# Patient Record
Sex: MALE | Race: WHITE | NOT HISPANIC OR LATINO | Employment: FULL TIME | URBAN - METROPOLITAN AREA
[De-identification: names, ages, dates, MRNs, and addresses within clinical notes are randomized per-mention and may not be internally consistent; named-entity substitution may affect disease eponyms.]

---

## 2017-02-06 ENCOUNTER — OFFICE VISIT (OUTPATIENT)
Dept: DERMATOLOGY | Facility: CLINIC | Age: 22
End: 2017-02-06

## 2017-02-06 VITALS — HEART RATE: 55 BPM | SYSTOLIC BLOOD PRESSURE: 115 MMHG | DIASTOLIC BLOOD PRESSURE: 68 MMHG

## 2017-02-06 DIAGNOSIS — L63.9 ALOPECIA AREATA: ICD-10-CM

## 2017-02-06 DIAGNOSIS — L20.9 ATOPIC DERMATITIS, UNSPECIFIED TYPE: ICD-10-CM

## 2017-02-06 DIAGNOSIS — L63.9 ALOPECIA AREATA: Primary | ICD-10-CM

## 2017-02-06 DIAGNOSIS — L21.9 DERMATITIS, SEBORRHEIC: ICD-10-CM

## 2017-02-06 LAB
BASOPHILS # BLD AUTO: 0.1 10E9/L (ref 0–0.2)
BASOPHILS NFR BLD AUTO: 0.7 %
DEPRECATED CALCIDIOL+CALCIFEROL SERPL-MC: 22 UG/L (ref 20–75)
DIFFERENTIAL METHOD BLD: NORMAL
EOSINOPHIL # BLD AUTO: 0.3 10E9/L (ref 0–0.7)
EOSINOPHIL NFR BLD AUTO: 3.5 %
ERYTHROCYTE [DISTWIDTH] IN BLOOD BY AUTOMATED COUNT: 12.7 % (ref 10–15)
FERRITIN SERPL-MCNC: 65 NG/ML (ref 26–388)
HCT VFR BLD AUTO: 46.5 % (ref 40–53)
HGB BLD-MCNC: 15.7 G/DL (ref 13.3–17.7)
IMM GRANULOCYTES # BLD: 0 10E9/L (ref 0–0.4)
IMM GRANULOCYTES NFR BLD: 0.3 %
IRON SATN MFR SERPL: 13 % (ref 15–46)
IRON SERPL-MCNC: 47 UG/DL (ref 35–180)
LYMPHOCYTES # BLD AUTO: 0.9 10E9/L (ref 0.8–5.3)
LYMPHOCYTES NFR BLD AUTO: 11.5 %
MCH RBC QN AUTO: 31.3 PG (ref 26.5–33)
MCHC RBC AUTO-ENTMCNC: 33.8 G/DL (ref 31.5–36.5)
MCV RBC AUTO: 93 FL (ref 78–100)
MONOCYTES # BLD AUTO: 0.8 10E9/L (ref 0–1.3)
MONOCYTES NFR BLD AUTO: 10.1 %
NEUTROPHILS # BLD AUTO: 5.5 10E9/L (ref 1.6–8.3)
NEUTROPHILS NFR BLD AUTO: 73.9 %
NRBC # BLD AUTO: 0 10*3/UL
NRBC BLD AUTO-RTO: 0 /100
PLATELET # BLD AUTO: 195 10E9/L (ref 150–450)
RBC # BLD AUTO: 5.01 10E12/L (ref 4.4–5.9)
THYROPEROXIDASE AB SERPL-ACNC: <10 IU/ML
TIBC SERPL-MCNC: 363 UG/DL (ref 240–430)
TSH SERPL DL<=0.005 MIU/L-ACNC: 0.82 MU/L (ref 0.4–4)
WBC # BLD AUTO: 7.5 10E9/L (ref 4–11)

## 2017-02-06 RX ORDER — TACROLIMUS 1 MG/G
OINTMENT TOPICAL 2 TIMES DAILY
Qty: 100 G | Refills: 0 | Status: SHIPPED | OUTPATIENT
Start: 2017-02-06 | End: 2018-01-29

## 2017-02-06 RX ORDER — KETOCONAZOLE 20 MG/ML
SHAMPOO TOPICAL
Qty: 120 ML | Refills: 5 | Status: SHIPPED | OUTPATIENT
Start: 2017-02-06 | End: 2018-01-29

## 2017-02-06 ASSESSMENT — PAIN SCALES - GENERAL: PAINLEVEL: NO PAIN (0)

## 2017-02-06 NOTE — PROGRESS NOTES
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 Referred by Smiley Yougn.         History of Present Illness:  Mr. Wayne Grigsby is a 21 year old male who presents in consultation from Dr. Smiley Moran of [facility] in [facility]. Ashwin presents today with his mother.    Ashwin first noticed patchy hair loss in 6/2016, with clumps of hair falling out throughout his scalp as well as thinning of his left eyebrow. He was seen by Dr. Moran on 9/12/2016, at which time  he was started on Clobetasol 0.05% BID, 2wks on , 1 wk off, for his scalp, Desonide 0.05% for his eyebrow, and received IL  Kenalog injections to the spots of focal hair loss. He returned to see Dr. Moran on 10/31/2016 with continued worsening of his hair loss; near complete thinning of entire scalp hair so at that time, he received a second series of IL Kenalog injections. He then shaved off the remainder of his hair. He then started to have significant regrowth, to the point where he doesn't notice any focal hair loss. He has continued the Clobetasol 1 wk on 2 wks off throughout his scalp. He discontinued the desonide as he has had regrowth of his eyebrow hair and the ointment was getting on his pillow case and hats. He has also noticed decreased arm and leg hair; no noticeable difference in pubic or underarm hair. Uses dove hair  strengthening shampoo BID.    Hx of atopic dermatitis, since early childhood. Has been on various topical steroids, then transitioned to Methotrexate and Cellcept; last used on 3/2014. Much improved since, but continues to have stubborn patches on legs, popliteal fossa, antecubital fosa, buttocks, posterior thigh, and groin area. Has been using TAC 0.1% ointment PRN. Bathes twice daily, no hx of bleach baths. Reports not using moisturizers regularly.    Past Medical History:   There is no problem list on file for this patient.    Past Medical History   Diagnosis Date     ADHD (attention deficit hyperactivity disorder)      Eczema      Past Surgical History   Procedure Laterality Date     Pe tubes       x3     Tonsillectomy & adenoidectomy       1998, redo adenoidectomy     Myringoplasty       2003       Social History:  The patient works as a . The patient denies use of tanning beds.  Lives in [place], [place] with his girlfriend. 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 Graduated highschool last year.      Family History:  Family History   Problem Relation Age of Onset     Arthritis Sister      DEBRA     Hypothyroidism Sister      Hashimoto's     Hypothyroidism Sister      Hashimoto's     Asthma Mother      HEART DISEASE Mother      Caban- Parkinson White     Rheumatoid Arthritis Maternal Aunt      Multiple Sclerosis Maternal Aunt      Seasonal/Environmental Allergies Brother      Seasonal/Environmental Allergies Sister      Seasonal/Environmental Allergies Sister      Seasonal/Environmental Allergies Mother      Seasonal/Environmental Allergies Father      Asthma Brother      Eczema Brother      Skin Cancer No family hx of        Medications:  Current Outpatient Prescriptions   Medication Sig Dispense Refill     albuterol (PROAIR HFA, PROVENTIL HFA, VENTOLIN HFA) 108 (90 BASE) MCG/ACT inhaler Inhale 2 puffs into the lungs every 6 hours       Dexmethylphenidate HCl (FOCALIN PO) Take 5 mg by mouth daily        Allergies    Allergen Reactions     Cefzil [Cefprozil] Hives         Review of Systems:  -Skin Establ Pt: The patient denies any new rash, pruritus, or lesions that are symptomatic, changing or bleeding, except as per HPI., Const: Denies fevers, chills or changes in weight. , GI: The patient denies nausea, vomiting, diarrhea or abdominal pain.  -Constitutional: The patient denies fatigue, fevers, chills, unintended weight loss, and night sweats.  -HEENT: Patient denies nonhealing oral sores.  -Skin: As above in HPI. No additional skin concerns.    Physical exam:  Vitals: /68 mmHg  Pulse 55  GEN: This is a well developed, well-nourished male in no acute distress, in a pleasant mood.    SKIN: Focused examination of the scalp, face, forearms, lower legs was performed.  -Focal hair loss at left posterior auricular area  -Frontal hair pattern remain receded, but shows good regrowth  -Great regrowth throughout remainder of scalp  -No other lesions of concern on areas examined.     Impression/Plan:  1. Alopecia areata w/ Seborrheic dermatitis- initial episode began in 8/2016 with focal hair loss, progressing to diffuse hair loss. Has since shown significant regrowth with almost complete return of normal hair; with the exception of focal hair loss along left occipital and frontal scalp. Family history of hashimoto's and no previous lab studies.    Continue Clobetasol along thinning areas as per current schedule    Use 2% ketoconazole shampoo 2 times per week    Labs:anti-TPO, anti-thyroglubin, Vitamin D, Iron Studies    2. Atopic Dermatitis-    Bathe daily, bleach baths 2-3 / week per instructions    Use TCA 0.1% ointment on eczematous patches when flairing, use Protopic between    Eucerin / Aquaphor BID    CC Dr. Smiley Moran on close of this encounter.  Follow-up in 3 months, earlier for new or changing lesions.       Scribe Disclosure:   Rafa KOCH, MS4, am serving as a scribe; to document services personally  performed by Dr. Dixon- -based on data collection and the provider's statements to me.     I agree with the PFSH and ROS as completed by the Medical Student. The remainder of the encounter was performed by me and scribed by the Medical Student. The scribed note accurately reflects my personal services and the medical decisions made by me.      Isabel Dixon MD  Professor and Chair  Department of Dermatology  [facility]

## 2017-02-06 NOTE — MR AVS SNAPSHOT
After Visit Summary   2/6/2017    Wayne Grigsby    MRN: [id number]           Patient Information     Date Of Birth          1995        Visit Information        Provider Department      2/6/2017 7:30 AM Isabel Dixon MD [facility] Dermatology        Care Instructions        ATOPIC DERMATITIS      WHAT IS ATOPIC DERMATITIS?  Atopic dermatitis (also called Eczema) is a condition of the skin where the skin is dry, red, and itchy. The main function of the skin is to provide a barrier from the environment and is also the first defense of the immune system.    In atopic dermatitis the skin barrier is decreased, and the skin is easily irritated. Also, the skin s immune system is different. If there are increased allergic type cells in the skin, the skin may become red and  hyper-excitable.  This leads to itching and a subsequent rash.    WHY DO PEOPLE GET ATOPIC DERMATITIS?  There is no single answer because many factors are involved. It is likely a combination of genetic makeup and environmental triggers and /or exposures; Excessive drying or sweating of the skin, irritating soaps, dust mites, and pet dander area some of the more common triggers. There are no blood tests that can be done to confirm this diagnosis. This history and appearance of the skin is usually sufficient for a diagnosis. However, in some cases if the rash does not fit with the history or respond appropriately to treatment, a skin biopsy may be helpful. Many children do outgrow atopic dermatitis or get better; however, many continue to have sensitive skin into adulthood.    Asthma and hay fever area seen in many patients with atopic dermatitis; however, asthma flares do not necessarily occur at the same time as skin flare ups.     PREVENTING FLARES OF ATOPIC DERMATITIS  The first step is to maintain the skin s barrier function. Keep the skin well moisturized. Avoid irritants and triggers. Use prescription medicine when  there are red or rough areas to help the skin to return to normal as quickly as possible. Try to limit scratching.    IF EVERYTHING IS BEING DONE AS IT SHOULD, WHY DOES THE RASH KEEP FLARING?  If you keep the skin well moisturized, and avoid coming in contact with things you know irritate your child s skin, there will be less flares. However, some flares of atopic dermatitis are beyond your control. You should work with your physician to come up with a plan that minimizes flares while minimizing long term use of medications that suppress the immune system.    WHAT ARE THE TRIGGERS?    Triggers are different for different people. The most common triggers are:    Heat and sweat for some individuals and cold weather for others    House dust mites, pet fur    Wool; synthetic fabrics like nylon; dyed fabrics    Tobacco smoke    Fragrance in; shampoos, soaps, lotions, laundry detergents, fabric softeners    Saliva or prolonged exposure to water    WHAT ABOUT FOOD ALLERGIES?  This is a very controversial topic; as many believe that food allergies are responsible for skin flares. In some cases, specific foods may cause worsening of atopic dermatitis. However, this occurs in a minority of cases and usually happens within a few hours of ingestion. While food allergy is more common in children with eczema, foods are specific triggers for flares in only a small percentage of children. If you notice that the skin flares after certain food, you can see if eliminating one food at a time makes a difference, as long as your child can still enjoy a well-balanced diet.    There are blood (RAST) and skin (PRICK) tests that can check for allergies, but they are often positive in children who are not truly allergic. Therefore, it is important that you work with your allergist and dermatologist to determine which foods are relevant and causing true symptoms. Extreme food elimination diets without the guidance of your doctor, which have  become more popular in recent years, may even results in worsening of the skin rash due to malnutrition and avoidance of essential nutrients.    TREATMENT:   Treatments are aimed at minimizing exposure to irritating factors and decreasing the skin inflammation which results in an itchy rash.    There are many different treatment options, which depend on your child s rash, its location and severity. Topical treatments include corticosteroids and steroid-like creams such as Protopic and Elidel which do not thin the skin. Please read the discussions below regarding risks and benefits of all these creams.    Occasionally bacterial or viral infections can occur which flare the skin and require oral and/or topical antibiotics or antiviral. In some cases bleach baths 2-3 times weekly can be helpful to prevent recurrent infection.    For severe disease, strong oral medications such as methotrexate or azathioprine (Imuran) may be needed. There medications require close monitoring and follow-up. You should discuss the risks/benefits/alternatives or these medications with your dermatologist to come up with the best treatment plan for your child.    Further Information:  There is much more information available from the [facility] website: www.eczemacenter.org     Gentle Skin Care  Below is a list of products our providers recommend for gentle skin care.  Moisturizers:    Lighter; Cetaphil Cream, CeraVe, Aveeno and Vanicream Light     Thicker; Aquaphor Ointment, Vaseline, Petrolium Jelly, Eucerin and Vanicream    Avoid Lotions (too thin)  Mild Cleansers:    Dove- Fragrance Free    CeraVe     Vanicream Cleansing Bar    Cetaphil Cleanser     Aquaphor 2 in1 Gentle Wash and Shampoo       Laundry Products:    All Free and Clear    Cheer Free    Generic Brands are okay as long as they are  Fragrance Free      Avoid fabric softeners  and dryer sheets   Sunscreens: SPF 30 or greater     Sunscreens that  "contain Zinc Oxide or Titanium Dioxide should be applied, these are physical blockers. Spray or  chemical  sunscreens should be avoided.        Shampoo and Conditioners:    Free and Clear by Vanicream    Aquaphor 2 in 1 Gentle Wash and Shampoo    California Baby  super sensitive   Oils:    Mineral Oil     Emu Oil     For some patients, coconut and sunflower seed oil      Generic Products are an okay substitute, but make sure they are fragrance free.  *Avoid product that have fragrance added to them. Organic does not mean  fragrance free.  In fact patients with sensitive skin can become quite irritated by organic products.     1. Daily bathing is recommended. Make sure you are applying a good moisturizer after bathing every time.  2. Use Moisturizing creams at least twice daily to the whole body. Your provider may recommend a lighter or heavier moisturizer based on your child s severity and that time of year it is.  3. Creams are more moisturizing than lotions  4. Products should be fragrance free- soaps, creams, detergents.  Products such as Marc and Marc as well as the Cetaphil \"Baby\" line contain fragrance and may irritate your child's sensitive skin.    Care Plan:  1. Keep bathing and showering short, less than 15 minutes   2. Always use lukewarm warm when possible. AVOID very HOT or COLD water  3. DO NOT use bubble bath  4. Limit the use of soaps. Focus on the skin folds, face, armpits, groin and feet  5. Do NOT vigorously scrub when you cleanse your skin  6. After bathing, PAT your skin lightly with a towel. DO NOT rub or scrub when drying  7. ALWAYS apply a moisturizer immediately after bathing. This helps to  lock in  the moisture. * IF YOU WERE PRESCRIBED A TOPICAL MEDICATION, APPLY YOUR MEDICATION FIRST THEN COVER WITH YOUR DAILY MOISTURIZER  8. Reapply moisturizing agents at least twice daily to your whole body  9. Do not use products such as powders, perfumes, or colognes on your skin  10. Avoid " "saunas and steam baths. This temperature is too HOT  11. Avoid tight or  scratchy  clothing such as wool  12. Always wash new clothing before wearing them for the first time  13. Sometimes a humidifier or vaporizer can be used at night can help the dry skin. Remember to keep it clean to avoid mold growth.        Bleach Bath Instructions  What are dilute bleach baths?  Dilute bleach baths are used to help fight bacteria that is commonly found on the skin; this bacteria may be preventing your skin from healing. If is also used to calm inflammation in skin, even if infection is not present. The dilution ratio we recommend is the same concentration that is in a swimming pool.     Type;  *Regular, plain household bleach used for cleaning clothing. Brand or Generic is okay.   *Make sure this is plain or concentrated bleach. This should NOT be \"splash free, splash less or color safe.\"   *There should not be any added fragrance to the bleach; such a lavender.    How do I make a dilute bleach bath?  *Fill your tub with lukewarm water with at least 4-6 inches of water.  *Pour 1/4 to 1/2 cup of bleach into an adult size bath tub.  *For smaller tubs (infant tubs), add two tablespoons of bleach to the tub water. * Bleach baths work better if your child is able to submerge most of their skin, so consider placing the infant tub in the larger tub.   *Repeat bleach baths as recommended by your provider.    Other information:  *Do not pour bleach directly onto the skin.  *If is safe to get the bleach mixture on your face and scalp.  *Do not drink the bleach mixture.  *Keep bleach bottle out of reach of children.          ALOPECIA AREATA  ALOPECIA AREATA  Alopecia means hair loss. In alopecia areata the immune system injures the hair follicles (structures that contain the roots of hair), causing hair loss. It is not contagious. This condition most often affects healthy people.   When you have alopecia areata, the hair typically falls " out in patches on the scalp but can affect ever where on the body too. There are several different types of hair loss, each with a different name;    Alopecia Areata-hair loss in patches, usually round and smooth    Alopecia Totalis-total hair loss on the entire scalp    Alopecia Universalis- loss of all hair on body and scalp  Not everyone with alopecia loses all the hair on their scalp or body, this only occurs in about 5% of people.   The hair loss may or may not be permanent. The hair often grows back but may fall out again. Sometimes hair loss may last for many years and sometimes it occurs once and never happens again.    Other signs of alopecia areata are nail changes. This can affect your fingernails and toenails. Nails can have pinpoint dents, called pitting, white spots or lines, lose their shine or have roughness to them. They may also become thin and split.     HOW DO DERMATOLOGISTS DIAGNOSE ALOPECIA AREATA?  Often a dermatologist can diagnose alopecia areata by looking at the hair loss. If the patch of hair loss is expanding, the doctor may pull out a few hairs. If the diagnosis is not clear, sometimes a skin biopsy will be completed to confirm that the disease is alopecia areata. A skin biopsy consists of removing two small pieces of skin on an area of hair loss that can be studied under a microscope.  Blood testing may be necessary if we think you may have another autoimmune disease.     HOW DO DERMATOLOGISTS TREAT ALOPECIA AREATA?  There is no cure for alopecia areata. Hair often re-grows on its own. Treatment can help the hair re-grow more quickly. Your Dermatologist may prescribe one or more of the following to help the hair re-grow more quickly.     Corticosteroids: This medication suppresses the immune system in the skin. It can be given in the form of an injection (shot), cream, lotion or ointment. Less often, patients are prescribed oral forms of corticosteroids in pill form.     Minoxidil: A  hair re-growth medicine, Minoxidil 5% may help some patients re-grow their hair. Both children and adults can use it. Patients apply this twice daily to the scalp, brows or beard. New hair may start to grow in about 3 months. This treatment is typically used at the same time as another treatment.     Other treatments: There are other treatments that your dermatologist might discuss with you. Patients often get more than one treatment at a time. A mix of two or more treatments often boosts success.    OUTCOMES  When a person has alopecia areata, the hair will start to re-grow when the body gets the right signals. Sometimes this happens without treatment. Even with treatment, new hair loss can occur. Everything depends on how the immune system reacts.    Re-growing hair: It is likely that hair will grow back even without treatment. It may fall out again, though. Most patients lose their hair more than once before the disease goes away for good. Even people who lose all their hair on their scalp and body can have their hair grow back. When hair loss is widespread (lots of hair loss on the scalp and/or body), there is a greater chance that the hair will not re-grow.  When hair re-grows, it can be white or fine at first. A person s own hair color and texture often returns later.    How long it lasts: This varies. For some people, the disease never returns. Others lose and re-grow hair for many years. No one can predict when the hair might re-grow or fall out again. This lack of control makes the disease frustrating.     RESOURCES  National Alopecia Areata Foundation (NAAF)- www.naaf.org   Alopecia World - www.alopeciaworld.com     Instructions:  -Use Clobetasol along thinning areas- back left and frontal patches  -Use medicated shampoos- Anti-dandruff (ie Head and Shoulders) or 2% Ketoconazole shampoo, use a couple times a week  -Lab tests today: thyroid antibodies, Vitamin D, Iron studies; we will call you with the  results  -Eczema treatment: bathe once a day, bleach baths 2-3/week- increase/decrease based on how your skin is doing, use Triamcinolone ointment / Protopic on stubborn areas after bath, and cover with an ointment (Aquaphor, Eucerin) / vaseline  -When having a flair use triamcinolone, when doing well use Protopic to the stubborn areas   -Return in 3 months            Follow-ups after your visit        Your next 10 appointments already scheduled     May 30, 2017  2:30 PM   (Arrive by 2:15 PM)   RETURN HAIRLOSS with Isabel iDxon MD   [facility] ([facility])    [address]  [address] [place] [address] [phone]              Who to contact     Please call your clinic at [phone] to:    Ask questions about your health    Make or cancel appointments    Discuss your medicines    Learn about your test results    Speak to your doctor   If you have compliments or concerns about an experience at your clinic, or if you wish to file a complaint, please contact [facility] Patient Relations at [phone] or email us at [email or web address]         Additional Information About Your Visit        Care EveryWhere ID     This is your Care EveryWhere ID. This could be used by other organizations to access your [facility] medical records  [id number]        Your Vitals Were     Pulse                   55            Blood Pressure from Last 3 Encounters:   02/06/17 115/68   06/29/16 127/83   06/22/16 128/60    Weight from Last 3 Encounters:   No data found for Wt              Today, you had the following     No orders found for display         Today's Medication Changes          These changes are accurate as of: 2/6/17  8:38 AM.  If you have any questions, ask your nurse or doctor.               Stop taking these medicines if you haven't already. Please contact your care team if you have questions.     IMIPRAMINE HCL PO    Stopped by:  Isabel Dixon MD                    Primary Care Provider Office Phone # Fax #    [facility] [phone] [address] [facility] [address]        Thank you!     Thank you for choosing [facility]  for your care. Our goal is always to provide you with excellent care. Hearing back from our patients is one way we can continue to improve our services. Please take a few minutes to complete the written survey that you may receive in the mail after your visit with us. Thank you!             Your Updated Medication List - Protect others around you: Learn how to safely use, store and throw away your medicines at www.disposemymeds.org.          This list is accurate as of: 2/6/17  8:38 AM.  Always use your most recent med list.                   Brand Name Dispense Instructions for use    albuterol 108 (90 BASE) MCG/ACT Inhaler    PROAIR HFA/PROVENTIL HFA/VENTOLIN HFA     Inhale 2 puffs into the lungs every 6 hours       FOCALIN PO      Take 5 mg by mouth daily

## 2017-02-06 NOTE — NURSING NOTE
Dermatology Rooming Note    Wayne Grigsby's goals for this visit include:   Chief Complaint   Patient presents with     Derm Problem     Patient comes to clinic today for hairloss. Referred by Smiley Young.     Megha Ford, [facility]

## 2017-02-06 NOTE — LETTER
2/6/2017     RE: Wayne Grigsby  [address] [facility] [address]     Dear Colleague,    Thank you for referring your patient, Wayne Grigsby, to the [facility] DERMATOLOGY at [facility]. Please see a copy of my visit note below.    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 Referred by Smiley Young.         History of Present Illness:  Mr. Wayne Grigsby is a 21 year old male who presents in consultation from Dr. Smiley Moran of [facility] in [facility]. Ashwin presents today with his mother.    Ashwin first noticed patchy hair loss in 6/2016, with clumps of hair falling out throughout his scalp as well as thinning of his left eyebrow. He was seen by Dr. Moran on 9/12/2016, at which time  he was started on Clobetasol 0.05% BID, 2wks on , 1 wk off, for his scalp, Desonide 0.05% for his eyebrow, and received IL  Kenalog injections to the spots of focal hair loss. He returned to see Dr. Moran on 10/31/2016 with continued worsening of his hair loss; near complete thinning of entire scalp hair so at that time, he received a second series of IL Kenalog injections. He then shaved off the remainder of his hair. He then started to have significant regrowth, to the point where he doesn't notice any focal hair loss. He has continued the Clobetasol 1 wk on 2 wks off  throughout his scalp. He discontinued the desonide as he has had regrowth of his eyebrow hair and the ointment was getting on his pillow case and hats. He has also noticed decreased arm and leg hair; no noticeable difference in pubic or underarm hair. Uses dove hair strengthening shampoo BID.    Hx of atopic dermatitis, since early childhood. Has been on various topical steroids, then transitioned to Methotrexate and Cellcept; last used on 3/2014. Much improved since, but continues to have stubborn patches on legs, popliteal fossa, antecubital fosa, buttocks, posterior thigh, and groin area. Has been using TAC 0.1% ointment PRN. Bathes twice daily, no hx of bleach baths. Reports not using moisturizers regularly.    Past Medical History:   There is no problem list on file for this patient.    Past Medical History   Diagnosis Date     ADHD (attention deficit hyperactivity disorder)      Eczema      Past Surgical History   Procedure Laterality Date     Pe tubes       x3     Tonsillectomy & adenoidectomy       1998, redo adenoidectomy     Myringoplasty       2003       Social History:  The patient works as a . The patient denies use of tanning beds.  Lives in [place], [place] with his girlfriend. 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 Graduated highschool last year.      Family History:  Family History   Problem Relation Age of Onset     Arthritis Sister      DEBRA     Hypothyroidism Sister      Hashimoto's     Hypothyroidism Sister      Hashimoto's     Asthma Mother      HEART DISEASE Mother      Caban- Parkinson White     Rheumatoid Arthritis Maternal Aunt      Multiple Sclerosis Maternal Aunt      Seasonal/Environmental Allergies Brother      Seasonal/Environmental Allergies Sister      Seasonal/Environmental Allergies Sister      Seasonal/Environmental Allergies Mother      Seasonal/Environmental Allergies Father      Asthma Brother      Eczema Brother      Skin Cancer No family hx of        Medications:  Current  Outpatient Prescriptions   Medication Sig Dispense Refill     albuterol (PROAIR HFA, PROVENTIL HFA, VENTOLIN HFA) 108 (90 BASE) MCG/ACT inhaler Inhale 2 puffs into the lungs every 6 hours       Dexmethylphenidate HCl (FOCALIN PO) Take 5 mg by mouth daily        Allergies   Allergen Reactions     Cefzil [Cefprozil] Hives         Review of Systems:  -Skin Establ Pt: The patient denies any new rash, pruritus, or lesions that are symptomatic, changing or bleeding, except as per HPI., Const: Denies fevers, chills or changes in weight. , GI: The patient denies nausea, vomiting, diarrhea or abdominal pain.  -Constitutional: The patient denies fatigue, fevers, chills, unintended weight loss, and night sweats.  -HEENT: Patient denies nonhealing oral sores.  -Skin: As above in HPI. No additional skin concerns.    Physical exam:  Vitals: /68 mmHg  Pulse 55  GEN: This is a well developed, well-nourished male in no acute distress, in a pleasant mood.    SKIN: Focused examination of the scalp, face, forearms, lower legs was performed.  -Focal hair loss at left posterior auricular area  -Frontal hair pattern remain receded, but shows good regrowth  -Great regrowth throughout remainder of scalp  -No other lesions of concern on areas examined.     Impression/Plan:  1. Alopecia areata w/ Seborrheic dermatitis- initial episode began in 8/2016 with focal hair loss, progressing to diffuse hair loss. Has since shown significant regrowth with almost complete return of normal hair; with the exception of focal hair loss along left occipital and frontal scalp. Family history of hashimoto's and no previous lab studies.    Continue Clobetasol along thinning areas as per current schedule    Use 2% ketoconazole shampoo 2 times per week    Labs:anti-TPO, anti-thyroglubin, Vitamin D, Iron Studies    2. Atopic Dermatitis-    Bathe daily, bleach baths 2-3 / week per instructions    Use TCA 0.1% ointment on eczematous patches when flairing,  use Protopic between    Eucerin / Aquaphor BID    CC Dr. Smiley Moran on close of this encounter.  Follow-up in 3 months, earlier for new or changing lesions.       Scribe Disclosure:   I, Rafa Zacarias, MS4, am serving as a scribe; to document services personally performed by Dr. Dixon- -based on data collection and the provider's statements to me.     I agree with the PFSH and ROS as completed by the Medical Student. The remainder of the encounter was performed by me and scribed by the Medical Student. The scribed note accurately reflects my personal services and the medical decisions made by me.      Isabel Dixon MD  Professor and Chair  Department of Dermatology  [facility]                Pictures taken of patient today to be placed in chart for future reference.    Again, thank you for allowing me to participate in the care of your patient.      Sincerely,    Isabel Dixon MD

## 2017-02-06 NOTE — PATIENT INSTRUCTIONS
ATOPIC DERMATITIS      WHAT IS ATOPIC DERMATITIS?  Atopic dermatitis (also called Eczema) is a condition of the skin where the skin is dry, red, and itchy. The main function of the skin is to provide a barrier from the environment and is also the first defense of the immune system.    In atopic dermatitis the skin barrier is decreased, and the skin is easily irritated. Also, the skin s immune system is different. If there are increased allergic type cells in the skin, the skin may become red and  hyper-excitable.  This leads to itching and a subsequent rash.    WHY DO PEOPLE GET ATOPIC DERMATITIS?  There is no single answer because many factors are involved. It is likely a combination of genetic makeup and environmental triggers and /or exposures; Excessive drying or sweating of the skin, irritating soaps, dust mites, and pet dander area some of the more common triggers. There are no blood tests that can be done to confirm this diagnosis. This history and appearance of the skin is usually sufficient for a diagnosis. However, in some cases if the rash does not fit with the history or respond appropriately to treatment, a skin biopsy may be helpful. Many children do outgrow atopic dermatitis or get better; however, many continue to have sensitive skin into adulthood.    Asthma and hay fever area seen in many patients with atopic dermatitis; however, asthma flares do not necessarily occur at the same time as skin flare ups.     PREVENTING FLARES OF ATOPIC DERMATITIS  The first step is to maintain the skin s barrier function. Keep the skin well moisturized. Avoid irritants and triggers. Use prescription medicine when there are red or rough areas to help the skin to return to normal as quickly as possible. Try to limit scratching.    IF EVERYTHING IS BEING DONE AS IT SHOULD, WHY DOES THE RASH KEEP FLARING?  If you keep the skin well moisturized, and avoid coming in contact with things you know irritate your child s  skin, there will be less flares. However, some flares of atopic dermatitis are beyond your control. You should work with your physician to come up with a plan that minimizes flares while minimizing long term use of medications that suppress the immune system.    WHAT ARE THE TRIGGERS?    Triggers are different for different people. The most common triggers are:    Heat and sweat for some individuals and cold weather for others    House dust mites, pet fur    Wool; synthetic fabrics like nylon; dyed fabrics    Tobacco smoke    Fragrance in; shampoos, soaps, lotions, laundry detergents, fabric softeners    Saliva or prolonged exposure to water    WHAT ABOUT FOOD ALLERGIES?  This is a very controversial topic; as many believe that food allergies are responsible for skin flares. In some cases, specific foods may cause worsening of atopic dermatitis. However, this occurs in a minority of cases and usually happens within a few hours of ingestion. While food allergy is more common in children with eczema, foods are specific triggers for flares in only a small percentage of children. If you notice that the skin flares after certain food, you can see if eliminating one food at a time makes a difference, as long as your child can still enjoy a well-balanced diet.    There are blood (RAST) and skin (PRICK) tests that can check for allergies, but they are often positive in children who are not truly allergic. Therefore, it is important that you work with your allergist and dermatologist to determine which foods are relevant and causing true symptoms. Extreme food elimination diets without the guidance of your doctor, which have become more popular in recent years, may even results in worsening of the skin rash due to malnutrition and avoidance of essential nutrients.    TREATMENT:   Treatments are aimed at minimizing exposure to irritating factors and decreasing the skin inflammation which results in an itchy rash.    There are  many different treatment options, which depend on your child s rash, its location and severity. Topical treatments include corticosteroids and steroid-like creams such as Protopic and Elidel which do not thin the skin. Please read the discussions below regarding risks and benefits of all these creams.    Occasionally bacterial or viral infections can occur which flare the skin and require oral and/or topical antibiotics or antiviral. In some cases bleach baths 2-3 times weekly can be helpful to prevent recurrent infection.    For severe disease, strong oral medications such as methotrexate or azathioprine (Imuran) may be needed. There medications require close monitoring and follow-up. You should discuss the risks/benefits/alternatives or these medications with your dermatologist to come up with the best treatment plan for your child.    Further Information:  There is much more information available from the [facility] website: www.eczemacenter.org     Gentle Skin Care  Below is a list of products our providers recommend for gentle skin care.  Moisturizers:    Lighter; Cetaphil Cream, CeraVe, Aveeno and Vanicream Light     Thicker; Aquaphor Ointment, Vaseline, Petrolium Jelly, Eucerin and Vanicream    Avoid Lotions (too thin)  Mild Cleansers:    Dove- Fragrance Free    CeraVe     Vanicream Cleansing Bar    Cetaphil Cleanser     Aquaphor 2 in1 Gentle Wash and Shampoo       Laundry Products:    All Free and Clear    Cheer Free    Generic Brands are okay as long as they are  Fragrance Free      Avoid fabric softeners  and dryer sheets   Sunscreens: SPF 30 or greater     Sunscreens that contain Zinc Oxide or Titanium Dioxide should be applied, these are physical blockers. Spray or  chemical  sunscreens should be avoided.        Shampoo and Conditioners:    Free and Clear by Vanicream    Aquaphor 2 in 1 Gentle Wash and Shampoo    California Baby  super sensitive   Oils:    Mineral Oil     Emu  "Oil     For some patients, coconut and sunflower seed oil      Generic Products are an okay substitute, but make sure they are fragrance free.  *Avoid product that have fragrance added to them. Organic does not mean  fragrance free.  In fact patients with sensitive skin can become quite irritated by organic products.     1. Daily bathing is recommended. Make sure you are applying a good moisturizer after bathing every time.  2. Use Moisturizing creams at least twice daily to the whole body. Your provider may recommend a lighter or heavier moisturizer based on your child s severity and that time of year it is.  3. Creams are more moisturizing than lotions  4. Products should be fragrance free- soaps, creams, detergents.  Products such as Marc and Marc as well as the Cetaphil \"Baby\" line contain fragrance and may irritate your child's sensitive skin.    Care Plan:  1. Keep bathing and showering short, less than 15 minutes   2. Always use lukewarm warm when possible. AVOID very HOT or COLD water  3. DO NOT use bubble bath  4. Limit the use of soaps. Focus on the skin folds, face, armpits, groin and feet  5. Do NOT vigorously scrub when you cleanse your skin  6. After bathing, PAT your skin lightly with a towel. DO NOT rub or scrub when drying  7. ALWAYS apply a moisturizer immediately after bathing. This helps to  lock in  the moisture. * IF YOU WERE PRESCRIBED A TOPICAL MEDICATION, APPLY YOUR MEDICATION FIRST THEN COVER WITH YOUR DAILY MOISTURIZER  8. Reapply moisturizing agents at least twice daily to your whole body  9. Do not use products such as powders, perfumes, or colognes on your skin  10. Avoid saunas and steam baths. This temperature is too HOT  11. Avoid tight or  scratchy  clothing such as wool  12. Always wash new clothing before wearing them for the first time  13. Sometimes a humidifier or vaporizer can be used at night can help the dry skin. Remember to keep it clean to avoid mold " "growth.        Bleach Bath Instructions  What are dilute bleach baths?  Dilute bleach baths are used to help fight bacteria that is commonly found on the skin; this bacteria may be preventing your skin from healing. If is also used to calm inflammation in skin, even if infection is not present. The dilution ratio we recommend is the same concentration that is in a swimming pool.     Type;  *Regular, plain household bleach used for cleaning clothing. Brand or Generic is okay.   *Make sure this is plain or concentrated bleach. This should NOT be \"splash free, splash less or color safe.\"   *There should not be any added fragrance to the bleach; such a lavender.    How do I make a dilute bleach bath?  *Fill your tub with lukewarm water with at least 4-6 inches of water.  *Pour 1/4 to 1/2 cup of bleach into an adult size bath tub.  *For smaller tubs (infant tubs), add two tablespoons of bleach to the tub water. * Bleach baths work better if your child is able to submerge most of their skin, so consider placing the infant tub in the larger tub.   *Repeat bleach baths as recommended by your provider.    Other information:  *Do not pour bleach directly onto the skin.  *If is safe to get the bleach mixture on your face and scalp.  *Do not drink the bleach mixture.  *Keep bleach bottle out of reach of children.          ALOPECIA AREATA  ALOPECIA AREATA  Alopecia means hair loss. In alopecia areata the immune system injures the hair follicles (structures that contain the roots of hair), causing hair loss. It is not contagious. This condition most often affects healthy people.   When you have alopecia areata, the hair typically falls out in patches on the scalp but can affect ever where on the body too. There are several different types of hair loss, each with a different name;    Alopecia Areata-hair loss in patches, usually round and smooth    Alopecia Totalis-total hair loss on the entire scalp    Alopecia Universalis- loss " of all hair on body and scalp  Not everyone with alopecia loses all the hair on their scalp or body, this only occurs in about 5% of people.   The hair loss may or may not be permanent. The hair often grows back but may fall out again. Sometimes hair loss may last for many years and sometimes it occurs once and never happens again.    Other signs of alopecia areata are nail changes. This can affect your fingernails and toenails. Nails can have pinpoint dents, called pitting, white spots or lines, lose their shine or have roughness to them. They may also become thin and split.     HOW DO DERMATOLOGISTS DIAGNOSE ALOPECIA AREATA?  Often a dermatologist can diagnose alopecia areata by looking at the hair loss. If the patch of hair loss is expanding, the doctor may pull out a few hairs. If the diagnosis is not clear, sometimes a skin biopsy will be completed to confirm that the disease is alopecia areata. A skin biopsy consists of removing two small pieces of skin on an area of hair loss that can be studied under a microscope.  Blood testing may be necessary if we think you may have another autoimmune disease.     HOW DO DERMATOLOGISTS TREAT ALOPECIA AREATA?  There is no cure for alopecia areata. Hair often re-grows on its own. Treatment can help the hair re-grow more quickly. Your Dermatologist may prescribe one or more of the following to help the hair re-grow more quickly.     Corticosteroids: This medication suppresses the immune system in the skin. It can be given in the form of an injection (shot), cream, lotion or ointment. Less often, patients are prescribed oral forms of corticosteroids in pill form.     Minoxidil: A hair re-growth medicine, Minoxidil 5% may help some patients re-grow their hair. Both children and adults can use it. Patients apply this twice daily to the scalp, brows or beard. New hair may start to grow in about 3 months. This treatment is typically used at the same time as another treatment.      Other treatments: There are other treatments that your dermatologist might discuss with you. Patients often get more than one treatment at a time. A mix of two or more treatments often boosts success.    OUTCOMES  When a person has alopecia areata, the hair will start to re-grow when the body gets the right signals. Sometimes this happens without treatment. Even with treatment, new hair loss can occur. Everything depends on how the immune system reacts.    Re-growing hair: It is likely that hair will grow back even without treatment. It may fall out again, though. Most patients lose their hair more than once before the disease goes away for good. Even people who lose all their hair on their scalp and body can have their hair grow back. When hair loss is widespread (lots of hair loss on the scalp and/or body), there is a greater chance that the hair will not re-grow.  When hair re-grows, it can be white or fine at first. A person s own hair color and texture often returns later.    How long it lasts: This varies. For some people, the disease never returns. Others lose and re-grow hair for many years. No one can predict when the hair might re-grow or fall out again. This lack of control makes the disease frustrating.     RESOURCES  National Alopecia Areata Foundation (NAAF)- www.naaf.org   Alopecia World - www.alopeciaworld.com     Instructions:  -Use Clobetasol along thinning areas- back left and frontal patches  -Use medicated shampoos- Anti-dandruff (ie Head and Shoulders) or 2% Ketoconazole shampoo, use a couple times a week  -Lab tests today: thyroid antibodies, Vitamin D, Iron studies; we will call you with the results  -Eczema treatment: bathe once a day, bleach baths 2-3/week- increase/decrease based on how your skin is doing, use Triamcinolone ointment / Protopic on stubborn areas after bath, and cover with an ointment (Aquaphor, Eucerin) / vaseline  -When having a flair use triamcinolone, when doing well  use Protopic to the stubborn areas   -Return in 3 months

## 2018-01-29 DIAGNOSIS — L21.9 DERMATITIS, SEBORRHEIC: ICD-10-CM

## 2018-01-29 DIAGNOSIS — L20.9 ATOPIC DERMATITIS, UNSPECIFIED TYPE: ICD-10-CM

## 2018-01-29 NOTE — TELEPHONE ENCOUNTER
Last apt: 2/6/17   Future apt: None     Call center messaged us noting that the patient is requesting refills for tacrolimus ointment, and ketoconazole shampoo. Was wondering if she could get refills for these medications before his appointment? He was put on the recall list for May.     Impression/Plan:  1. Alopecia areata w/ Seborrheic dermatitis- initial episode began in 8/2016 with focal hair loss, progressing to diffuse hair loss. Has since shown significant regrowth with almost complete return of normal hair; with the exception of focal hair loss along left occipital and frontal scalp. Family history of hashimoto's and no previous lab studies.    Continue Clobetasol along thinning areas as per current schedule    Use 2% ketoconazole shampoo 2 times per week    Labs:anti-TPO, anti-thyroglubin, Vitamin D, Iron Studies     2. Atopic Dermatitis-    Bathe daily, bleach baths 2-3 / week per instructions    Use TCA 0.1% ointment on eczematous patches when flairing, use Protopic between    Eucerin / Aquaphor BID     CC Dr. Smiley Moran on close of this encounter.  Follow-up in 3 months, earlier for new or changing lesions.

## 2018-01-30 RX ORDER — KETOCONAZOLE 20 MG/ML
SHAMPOO TOPICAL
Qty: 120 ML | Refills: 5 | Status: SHIPPED | OUTPATIENT
Start: 2018-01-30 | End: 2018-05-07

## 2018-01-30 RX ORDER — TACROLIMUS 1 MG/G
OINTMENT TOPICAL 2 TIMES DAILY
Qty: 100 G | Refills: 1 | Status: SHIPPED | OUTPATIENT
Start: 2018-01-30 | End: 2018-05-07

## 2018-01-30 NOTE — TELEPHONE ENCOUNTER
Received a refill request for tacrolimus and ketoconazole as the resident on call. Patient was last seen 2/2017 by Dr. Dixon for alopecia areata and seb derm. After reviewing the patient's chart and the assessment and plan, refill request was accepted.     Sherie Oro MD  PGY-4 Dermatology  Pager: [phone]

## 2018-05-07 ENCOUNTER — OFFICE VISIT (OUTPATIENT)
Dept: DERMATOLOGY | Facility: CLINIC | Age: 23
End: 2018-05-07
Payer: COMMERCIAL

## 2018-05-07 ENCOUNTER — TELEPHONE (OUTPATIENT)
Dept: DERMATOLOGY | Facility: CLINIC | Age: 23
End: 2018-05-07

## 2018-05-07 VITALS — HEART RATE: 60 BPM | SYSTOLIC BLOOD PRESSURE: 116 MMHG | DIASTOLIC BLOOD PRESSURE: 67 MMHG

## 2018-05-07 DIAGNOSIS — L20.84 INTRINSIC ATOPIC DERMATITIS: Primary | ICD-10-CM

## 2018-05-07 DIAGNOSIS — Z51.81 MEDICATION MONITORING ENCOUNTER: ICD-10-CM

## 2018-05-07 DIAGNOSIS — L20.9 ATOPIC DERMATITIS, UNSPECIFIED TYPE: ICD-10-CM

## 2018-05-07 DIAGNOSIS — L21.9 DERMATITIS, SEBORRHEIC: ICD-10-CM

## 2018-05-07 DIAGNOSIS — Z51.81 MEDICATION MONITORING ENCOUNTER: Primary | ICD-10-CM

## 2018-05-07 LAB
ALBUMIN SERPL-MCNC: 4.2 G/DL (ref 3.4–5)
ALP SERPL-CCNC: 78 U/L (ref 40–150)
ALT SERPL W P-5'-P-CCNC: 23 U/L (ref 0–70)
ANION GAP SERPL CALCULATED.3IONS-SCNC: 7 MMOL/L (ref 3–14)
AST SERPL W P-5'-P-CCNC: 21 U/L (ref 0–45)
BASOPHILS # BLD AUTO: 0 10E9/L (ref 0–0.2)
BASOPHILS NFR BLD AUTO: 0.3 %
BILIRUB SERPL-MCNC: 0.3 MG/DL (ref 0.2–1.3)
BUN SERPL-MCNC: 18 MG/DL (ref 7–30)
CALCIUM SERPL-MCNC: 9.1 MG/DL (ref 8.5–10.1)
CHLORIDE SERPL-SCNC: 109 MMOL/L (ref 94–109)
CO2 SERPL-SCNC: 24 MMOL/L (ref 20–32)
CREAT SERPL-MCNC: 0.78 MG/DL (ref 0.66–1.25)
DEPRECATED CALCIDIOL+CALCIFEROL SERPL-MC: 18 UG/L (ref 20–75)
DIFFERENTIAL METHOD BLD: NORMAL
EOSINOPHIL # BLD AUTO: 0.5 10E9/L (ref 0–0.7)
EOSINOPHIL NFR BLD AUTO: 4.8 %
ERYTHROCYTE [DISTWIDTH] IN BLOOD BY AUTOMATED COUNT: 12.9 % (ref 10–15)
FERRITIN SERPL-MCNC: 33 NG/ML (ref 26–388)
GFR SERPL CREATININE-BSD FRML MDRD: >90 ML/MIN/1.7M2
GLUCOSE SERPL-MCNC: 94 MG/DL (ref 70–99)
HCT VFR BLD AUTO: 46.9 % (ref 40–53)
HGB BLD-MCNC: 16 G/DL (ref 13.3–17.7)
IMM GRANULOCYTES # BLD: 0 10E9/L (ref 0–0.4)
IMM GRANULOCYTES NFR BLD: 0.2 %
IRON SATN MFR SERPL: 18 % (ref 15–46)
IRON SERPL-MCNC: 68 UG/DL (ref 35–180)
LYMPHOCYTES # BLD AUTO: 1.4 10E9/L (ref 0.8–5.3)
LYMPHOCYTES NFR BLD AUTO: 14.6 %
MCH RBC QN AUTO: 31.7 PG (ref 26.5–33)
MCHC RBC AUTO-ENTMCNC: 34.1 G/DL (ref 31.5–36.5)
MCV RBC AUTO: 93 FL (ref 78–100)
MONOCYTES # BLD AUTO: 0.6 10E9/L (ref 0–1.3)
MONOCYTES NFR BLD AUTO: 6 %
NEUTROPHILS # BLD AUTO: 7.1 10E9/L (ref 1.6–8.3)
NEUTROPHILS NFR BLD AUTO: 74.1 %
NRBC # BLD AUTO: 0 10*3/UL
NRBC BLD AUTO-RTO: 0 /100
PLATELET # BLD AUTO: 253 10E9/L (ref 150–450)
POTASSIUM SERPL-SCNC: 4 MMOL/L (ref 3.4–5.3)
PROT SERPL-MCNC: 7.6 G/DL (ref 6.8–8.8)
RBC # BLD AUTO: 5.05 10E12/L (ref 4.4–5.9)
SODIUM SERPL-SCNC: 140 MMOL/L (ref 133–144)
THYROPEROXIDASE AB SERPL-ACNC: 11 IU/ML
TIBC SERPL-MCNC: 378 UG/DL (ref 240–430)
TSH SERPL DL<=0.005 MIU/L-ACNC: 0.89 MU/L (ref 0.4–4)
WBC # BLD AUTO: 9.6 10E9/L (ref 4–11)

## 2018-05-07 ASSESSMENT — PAIN SCALES - GENERAL: PAINLEVEL: NO PAIN (0)

## 2018-05-07 NOTE — PROGRESS NOTES
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 He says hair loss has increased since least visit.         History of Present Illness:  Mr. Wayne Grigsby is a 23 year old male who presents as a follow-up for alopecia areata and atopic dermatitis. The patient was last seen on 2/6/17 when he was continued on clobetasol cream and started on ketoconazole shampoo for his alopecia and was started on triamcinolone 0.1% ointment and Protopic for atopic dermatitis. Labs at this time showed a vitamin D of 22 and iron saturation index of 13. A letter was sent with these results and it was recommended that he take vitamin D and iron supplements, but the patient does not recall receiving this letter, although there is a copy in the letters section of Epic.    He feels that his symptoms have worsened in the past year. He reports an increase in hair loss and has shaved his head as a result of this. He has multiple pruritic lesions on his scalp and ears that he says have been present for about a year. These lesions do bleed when he scratches them. He is currently using the 2% ketoconazole shampoo every other day and uses Dove shampoo on opposite days. He used the clobetasol cream on his scalp 1-2 times about a year ago, but stopped using it as it caused burning on his scalp. He also reports constant pruritus on his scalp and extremities. The  itching is worse on his left ear and lower extremities. He notes that his skin is dry and that he has a rash on his lower extremities. He takes benadryl every night to help him sleep, but thinks that he is still scratching while sleeping. He also takes Zyrtec 3-4 times per week and does get some relief with this. He is using Protopic on his eczematous patches daily. He has not tried bleach baths.       Past Medical History:   Patient Active Problem List   Diagnosis     Alopecia areata     Atopic dermatitis, unspecified type     Past Medical History:   Diagnosis Date     ADHD (attention deficit hyperactivity disorder)      Eczema      Past Surgical History:   Procedure Laterality Date     MYRINGOPLASTY      2003     PE TUBES      x3     TONSILLECTOMY & ADENOIDECTOMY      1998, redo adenoidectomy       Social History:  The patient works as a . He lives with his girlfriend.     Family History:  Multiple siblings with eczema.  Sister with DEBRA.    Medications:  Current Outpatient Prescriptions   Medication Sig Dispense Refill     albuterol (PROAIR HFA, PROVENTIL HFA, VENTOLIN HFA) 108 (90 BASE) MCG/ACT inhaler Inhale 2 puffs into the lungs every 6 hours       Dexmethylphenidate HCl (FOCALIN PO) Take 5 mg by mouth daily        ketoconazole (NIZORAL) 2 % shampoo Apply to scalp, lather, leave on for a couple of minutes, then rinse, do 2 times per week 120 mL 5     tacrolimus (PROTOPIC) 0.1 % ointment Apply topically 2 times daily 100 g 1     Allergies   Allergen Reactions     Cefzil [Cefprozil] Hives         Review of Systems:  -As per HPI  -Skin: As above in HPI. No additional skin concerns.    Physical exam:  Vitals: /67 (BP Location: Left arm, Patient Position: Chair, Cuff Size: Adult Regular)  Pulse 60  GEN: This is a well developed, well-nourished male in no acute distress, in a pleasant mood.    SKIN: Focused examination of the scalp, face, forearms, and lower legs was performed.  -Diffuse patchy hair  loss, hair is shaved  -Scaly patches on scalp and erythematous scaly patches and plaques on extremities and trunk    -No other lesions of concern on areas examined.     Impression/Plan:  1. Alopecia areata - initial episode in 8/2016 that began with focal hair loss. Did have some improvement with regrowth 1 year ago, but has since had diffuse patchy hair loss. Has also had constant pruritus of scalp with associated scalp dermatitis.    Clobetasol cream to scalp at night    2% ketoconazole shampoo twice weekly    Discussed starting dupilumab , will schedule phone call follow-up in 1 week to discuss further questions    Labs: quant-gold, IgE, Vitamin D, Iron panel    2. Eczematous dermatitis/atopic dermatitis    Start bleach baths 2-3/week, instructions given    Clobetasol cream to body for 2 weeks, then use triamcinolone 0.1% ointment    Doxycycline 100 mg daily for 2 months    Continue Protopic    Eucerin/Aquaphor BID      Follow-up in 6-8 weeks.     Staff Involved:  Scribed by Alisha Rock, MS4 for Dr. Dixon.        I agree with the PFSH and ROS as completed by the Medical Student. The remainder of the encounter was performed by me and scribed by the Medical Student. The scribed note accurately reflects my personal services and the medical decisions made by me.      Isabel Dixon MD  Professor and Chair  Department of Dermatology  [facility]

## 2018-05-07 NOTE — PATIENT INSTRUCTIONS
"Dermatology Bleach Bath instructions    Bleach Bath Instructions  What are dilute bleach baths?  Dilute bleach baths are used to help fight bacteria that is commonly found on the skin; this bacteria may be preventing your skin from healing. If is also used to calm inflammation in skin, even if infection is not present. The dilution ratio we recommend is the same concentration that is in a swimming pool.     Type;  *Regular, plain household bleach used for cleaning clothing. Brand or Generic is okay.   *Make sure this is plain or concentrated bleach. This should NOT be \"splash free, splash less or color safe.\"   *There should not be any added fragrance to the bleach; such a lavender.    How do I make a dilute bleach bath?  *Fill your tub with lukewarm water with at least 4-6 inches of water.  *Pour 1/4 to 1/2 cup of bleach into an adult size bath tub.  *Repeat bleach baths as recommended by your provider.    Other information:  *Do not pour bleach directly onto the skin.  *If is safe to get the bleach mixture on your face and scalp.  *Do not drink the bleach mixture.  *Keep bleach bottle out of reach of children.      "

## 2018-05-07 NOTE — MR AVS SNAPSHOT
"              After Visit Summary   5/7/2018    Wayne Grigsby    MRN: [id number]           Patient Information     Date Of Birth          1995        Visit Information        Provider Department      5/7/2018 9:30 AM Isabel Dixon MD [facility] Dermatology        Today's Diagnoses     Medication monitoring encounter    -  1      Care Instructions    Dermatology Bleach Bath instructions    Bleach Bath Instructions  What are dilute bleach baths?  Dilute bleach baths are used to help fight bacteria that is commonly found on the skin; this bacteria may be preventing your skin from healing. If is also used to calm inflammation in skin, even if infection is not present. The dilution ratio we recommend is the same concentration that is in a swimming pool.     Type;  *Regular, plain household bleach used for cleaning clothing. Brand or Generic is okay.   *Make sure this is plain or concentrated bleach. This should NOT be \"splash free, splash less or color safe.\"   *There should not be any added fragrance to the bleach; such a lavender.    How do I make a dilute bleach bath?  *Fill your tub with lukewarm water with at least 4-6 inches of water.  *Pour 1/4 to 1/2 cup of bleach into an adult size bath tub.  *Repeat bleach baths as recommended by your provider.    Other information:  *Do not pour bleach directly onto the skin.  *If is safe to get the bleach mixture on your face and scalp.  *Do not drink the bleach mixture.  *Keep bleach bottle out of reach of children.              Follow-ups after your visit        Your next 10 appointments already scheduled     May 07, 2018 11:15 AM CDT   LAB with [facility] ([facility])    [address] [place] [id number] [phone]           Please do not eat 10-12 hours before your appointment if you are coming in fasting for labs on lipids, cholesterol, or glucose (sugar). This does not apply to pregnant " women. Water, hot tea and black coffee (with nothing added) are okay. Do not drink other fluids, diet soda or chew gum.            2018  2:30 PM CDT   (Arrive by 2:15 PM)   RETURN HAIRLOSS with Isabel Dixon MD   [facility] Dermatology ([facility])    [address] [id number] [phone]              Future tests that were ordered for you today     Open Future Orders        Priority Expected Expires Ordered    Comprehensive metabolic panel Routine  2019    IgE Routine  2019    CBC with platelets differential Routine  2019    Ferritin Routine  2019    Iron and iron binding capacity Routine  2019    Thyroid peroxidase antibody Routine  2019    Vitamin D Deficiency Routine  2019    TSH with free T4 reflex Routine  2019            Who to contact     Please call your clinic at [phone] to:    Ask questions about your health    Make or cancel appointments    Discuss your medicines    Learn about your test results    Speak to your doctor            Additional Information About Your Visit        [email or web address] Information     [employer] is an electronic gateway that provides easy, online access to your medical records. With [employer], you can request a clinic appointment, read your test results, renew a prescription or communicate with your care team.     To sign up for [employer]ia visit the website at [email or web address]   You will be asked to enter the access code listed below, as well as some personal information. Please follow the directions to create your username and password.     Your access code is: [email or web address]  Expires: 2018  6:30 AM     Your access code will  in 90 days. If you need help or a new code, please contact your [facility] or call [phone] for assistance.        Care EveryWhere  ID     This is your Care EveryWhere ID. This could be used by other organizations to access your [facility] medical records  [id number]        Your Vitals Were     Pulse                   60            Blood Pressure from Last 3 Encounters:   05/07/18 116/67   02/06/17 115/68   06/29/16 127/83    Weight from Last 3 Encounters:   No data found for Wt              We Performed the Following     M Tuberculosis by Quantiferon        Primary Care Provider Office Phone # Fax #    [facility] [phone] [address] [address]        Equal Access to Services     [facility] : Hadii aad ku hadasho Soomaali, waaxda luqadaha, qaybta kaalmada adeegyada, greg connelly . So [facility] [phone].    ATENCIÓN: Si habla español, tiene a kelly disposición servicios gratuitos de asistencia lingüística. [facility] [phone].    We comply with applicable federal civil rights laws and [place] laws. We do not discriminate on the basis of race, color, national origin, age, disability, sex, sexual orientation, or gender identity.            Thank you!     Thank you for choosing [facility] DERMATOLOGY  for your care. Our goal is always to provide you with excellent care. Hearing back from our patients is one way we can continue to improve our services. Please take a few minutes to complete the written survey that you may receive in the mail after your visit with us. Thank you!             Your Updated Medication List - Protect others around you: Learn how to safely use, store and throw away your medicines at www.disposemymeds.org.          This list is accurate as of 5/7/18 11:03 AM.  Always use your most recent med list.                   Brand Name Dispense Instructions for use Diagnosis    albuterol 108 (90 Base) MCG/ACT Inhaler    PROAIR HFA/PROVENTIL HFA/VENTOLIN HFA     Inhale 2 puffs into the lungs every 6 hours        FOCALIN PO      Take 5 mg by mouth  daily        ketoconazole 2 % shampoo    NIZORAL    120 mL    Apply to scalp, lather, leave on for a couple of minutes, then rinse, do 2 times per week    Dermatitis, seborrheic       tacrolimus 0.1 % ointment    PROTOPIC    100 g    Apply topically 2 times daily    Atopic dermatitis, unspecified type

## 2018-05-07 NOTE — LETTER
5/7/2018       RE: Wayne Grigsby  [address] [address]     Dear Colleague,    Thank you for referring your patient, Wayne Grigsby, to the [facility] DERMATOLOGY at [facility]. Please see a copy of my visit note below.    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 He says hair loss has increased since least visit.         History of Present Illness:  Mr. Wayne Grigsby is a 23 year old male who presents as a follow-up for alopecia areata and atopic dermatitis. The patient was last seen on 2/6/17 when he was continued on clobetasol cream and started on ketoconazole shampoo for his alopecia and was started on triamcinolone 0.1% ointment and Protopic for atopic dermatitis. Labs at this time showed a vitamin D of 22 and iron saturation index of 13. A letter was sent with these results and it was recommended that he take vitamin D and iron supplements, but the patient does not recall receiving this letter, although there is a copy in the letters section of Epic.    He feels that his symptoms have worsened in the past year. He reports an increase in hair loss and has shaved his head as a result of this. He has multiple pruritic lesions on his scalp and ears that he says have been present for about a year. These lesions do bleed when he scratches them. He is currently using  the 2% ketoconazole shampoo every other day and uses Dove shampoo on opposite days. He used the clobetasol cream on his scalp 1-2 times about a year ago, but stopped using it as it caused burning on his scalp. He also reports constant pruritus on his scalp and extremities. The itching is worse on his left ear and lower extremities. He notes that his skin is dry and that he has a rash on his lower extremities. He takes benadryl every night to help him sleep, but thinks that he is still scratching while sleeping. He also takes Zyrtec 3-4 times per week and does get some relief with this. He is using Protopic on his eczematous patches daily. He has not tried bleach baths.       Past Medical History:   Patient Active Problem List   Diagnosis     Alopecia areata     Atopic dermatitis, unspecified type     Past Medical History:   Diagnosis Date     ADHD (attention deficit hyperactivity disorder)      Eczema      Past Surgical History:   Procedure Laterality Date     MYRINGOPLASTY      2003     PE TUBES      x3     TONSILLECTOMY & ADENOIDECTOMY      1998, redo adenoidectomy       Social History:  The patient works as a . He lives with his girlfriend.     Family History:  Multiple siblings with eczema.  Sister with DEBRA.    Medications:  Current Outpatient Prescriptions   Medication Sig Dispense Refill     albuterol (PROAIR HFA, PROVENTIL HFA, VENTOLIN HFA) 108 (90 BASE) MCG/ACT inhaler Inhale 2 puffs into the lungs every 6 hours       Dexmethylphenidate HCl (FOCALIN PO) Take 5 mg by mouth daily        ketoconazole (NIZORAL) 2 % shampoo Apply to scalp, lather, leave on for a couple of minutes, then rinse, do 2 times per week 120 mL 5     tacrolimus (PROTOPIC) 0.1 % ointment Apply topically 2 times daily 100 g 1     Allergies   Allergen Reactions     Cefzil [Cefprozil] Hives         Review of Systems:  -As per HPI  -Skin: As above in HPI. No additional skin concerns.    Physical exam:  Vitals: /67 (BP Location:  Left arm, Patient Position: Chair, Cuff Size: Adult Regular)  Pulse 60  GEN: This is a well developed, well-nourished male in no acute distress, in a pleasant mood.    SKIN: Focused examination of the scalp, face, forearms, and lower legs was performed.  -Diffuse patchy hair loss, hair is shaved  -Scaly patches on scalp and erythematous scaly patches and plaques on extremities and trunk    -No other lesions of concern on areas examined.     Impression/Plan:  1. Alopecia areata - initial episode in 8/2016 that began with focal hair loss. Did have some improvement with regrowth 1 year ago, but has since had diffuse patchy hair loss. Has also had constant pruritus of scalp with associated scalp dermatitis.    Clobetasol cream to scalp at night    2% ketoconazole shampoo twice weekly    Discussed starting dupilumab , will schedule phone call follow-up in 1 week to discuss further questions    Labs: quant-gold, IgE, Vitamin D, Iron panel    2. Eczematous dermatitis/atopic dermatitis    Start bleach baths 2-3/week, instructions given    Clobetasol cream to body for 2 weeks, then use triamcinolone 0.1% ointment    Doxycycline 100 mg daily for 2 months    Continue Protopic    Eucerin/Aquaphor BID      Follow-up in 6-8 weeks.     Staff Involved:  Scribed by Alisha Rock, MS4 for Dr. Dixon.        I agree with the PFSH and ROS as completed by the Medical Student. The remainder of the encounter was performed by me and scribed by the Medical Student. The scribed note accurately reflects my personal services and the medical decisions made by me.      Isabel Dixon MD  Professor and Chair  Department of Dermatology  [facility]    Pictures were placed in Pt's chart today for future reference.                    Again, thank you for allowing me to participate in the care of your patient.      Sincerely,    Isabel Dixon MD

## 2018-05-07 NOTE — NURSING NOTE
Dermatology Rooming Note    Wayne Grigsby's goals for this visit include:   Chief Complaint   Patient presents with     Hair Loss     Wayne is returning to discuss hair loss. He says hair loss has increased since least visit.     Jennifer Wallace LPN

## 2018-05-08 LAB
IGE SERPL-ACNC: 2723 KIU/L (ref 0–114)
M TB TUBERC IFN-G BLD QL: NEGATIVE
M TB TUBERC IFN-G/MITOGEN IGNF BLD: 0.01 IU/ML

## 2018-05-11 NOTE — TELEPHONE ENCOUNTER
Prior Authorization Approval    Authorization Effective Date: 4/10/2018  Authorization Expiration Date: 5/10/2019  Medication: dupilumab (Dupixent) 300mg/ 2mL syringes   Approved Dose/Quantity: Loading dose & maintenance  Reference #: [facility] key# [id number]   Insurance Company: [employer] - Phone [phone] Fax [phone]  Expected CoPay: $0 after copay card     CoPay Card Available: Yes - pt verbally authorizes enrollment  Foundation Assistance Needed:  No  Which Pharmacy is filling the prescription (Not needed for infusion/clinic administered): [facility] - [place], [place] - [address] [place] [address] AVE   Pharmacy Notified: Yes  Patient Notified: Yes    ** Labs looked okay, waiting on rx to be sent, pt desires to start & coverage is secured

## 2018-05-14 ENCOUNTER — TELEPHONE (OUTPATIENT)
Dept: DERMATOLOGY | Facility: CLINIC | Age: 23
End: 2018-05-14

## 2018-05-14 NOTE — TELEPHONE ENCOUNTER
"This morning Wayne and I connected by phone. He is currently vacationing in [place] and reports his skin is doing well. We reviewed the A/P from his last visit -    1. Alopecia areata - initial episode in 8/2016 that began with focal hair loss. Did have some improvement with regrowth 1 year ago, but has since had diffuse patchy hair loss. Has also had constant pruritus of scalp with scaly patches present on scalp and auricles.     Clobetasol cream to scalp at night -     2% ketoconazole shampoo twice weekly - is doing about every other day    Discussed starting dupilumab injections, will schedule phone call follow-up in 1 week to discuss further questions - there were no further questions on this today  Labs: quant-gold, IgE, Vitamin D, Iron panel - results reviewed and are fine except for a Vitamin D level that is low, recommended Vitamin D 1000 IU daily; also the IgE level is elevated at over 2000. We discussed this goes along with his \"dermatitis\" and may be another reason for his aiming to take dupilumab     2. Eczematous dermatitis    Start bleach baths 2-3/week, instructions given - has not started as is here in [place] on vacatoin     Clobetasol cream to body for 2 weeks, then use triamcinolone 0.1% ointment - is primarily using protopic     Doxycycline 100 mg daily for 2 months - did not start as was concerned he could not drink alcohol while on this medication    Eucerin/Aquaphor BID    Will continue the plan above until Wayne's next visit and will change things then as deemed medically necessary.    Isabel Dixon MD        "

## 2018-05-20 RX ORDER — DOXYCYCLINE 100 MG/1
100 CAPSULE ORAL DAILY
Qty: 30 CAPSULE | Refills: 1 | Status: SHIPPED | OUTPATIENT
Start: 2018-05-20 | End: 2018-07-19

## 2018-05-20 RX ORDER — CLOBETASOL PROPIONATE 0.5 MG/G
CREAM TOPICAL DAILY
Qty: 45 G | Refills: 3 | Status: SHIPPED | OUTPATIENT
Start: 2018-05-20

## 2018-05-20 RX ORDER — TACROLIMUS 1 MG/G
OINTMENT TOPICAL 2 TIMES DAILY
Qty: 100 G | Refills: 1 | Status: SHIPPED | OUTPATIENT
Start: 2018-05-20

## 2018-05-20 RX ORDER — KETOCONAZOLE 20 MG/ML
SHAMPOO TOPICAL
Qty: 120 ML | Refills: 5 | Status: SHIPPED | OUTPATIENT
Start: 2018-05-20 | End: 2020-11-17

## 2018-05-21 ENCOUNTER — TELEPHONE (OUTPATIENT)
Dept: DERMATOLOGY | Facility: CLINIC | Age: 23
End: 2018-05-21

## 2018-05-21 NOTE — TELEPHONE ENCOUNTER
[facility] Call Center    Phone Message    May a detailed message be left on voicemail: yes    Reason for Call: Other: Pharmacy needs to clarify clobetasol (TEMOVATE) 0.05 % cream medication and dosage - different from last time     Action Taken: Message routed to:  [facility] Dermatology Clinic

## 2018-05-22 NOTE — TELEPHONE ENCOUNTER
Nurse returned pharmacy call and confirmed per MD's last encounter note 5/07/2018, that it is clobetasol cream not solution. Pharmacist thanked nurse for call.

## 2018-05-22 NOTE — TELEPHONE ENCOUNTER
[facility] Call Center    Phone Message    May a detailed message be left on voicemail: yes    Reason for Call: Other: Urban from the [facility] pharmacy is calling again to clarify - the order is for clobetasole cream, should it be solution?     Action Taken: Message routed to:  [facility]ia dermat

## 2018-06-01 NOTE — TELEPHONE ENCOUNTER
[facility] Call Center    Phone Message    May a detailed message be left on voicemail: yes    Reason for Call: Other: Pt's mother, Marilee, requesting to f/u on pt's Dupixent medication. She states the prior auth had been approved, and was wondering if Destiny could prescribe it. She states pt had been on vacation in [place], and now that he has returned, some of his alopecia symptoms have, as well. She requests a call on this when available.    Action Taken: Message routed to:  [facility]

## 2018-06-02 NOTE — TELEPHONE ENCOUNTER
Reviewed attached communication, last clinic visit note, and Dr. Dixon's telephone encounter from 5/14/18. Labs were uremarkable and plan was to start dupilumab, but prescription was never sent. Sent prescription for dupilumab 600 mg once, then 300 mg every other week to the [facility].     Mitzi Garcia MD  Dermatology Resident PGY2

## 2018-06-04 ENCOUNTER — TELEPHONE (OUTPATIENT)
Dept: DERMATOLOGY | Facility: CLINIC | Age: 23
End: 2018-06-04

## 2018-06-04 DIAGNOSIS — L20.84 INTRINSIC ATOPIC DERMATITIS: ICD-10-CM

## 2018-06-04 NOTE — TELEPHONE ENCOUNTER
Spoke with pharmacy and they would like a quantity dispense adjustment.   They mail medication for a 28 day supply. This means that this month he would need 6mls not 8mls. If you would like patient to have a two month supply they would need an order to dispense 10mls.    Please adjust order.    Will forward to NORAH.     Adri Gruber RN

## 2018-06-04 NOTE — TELEPHONE ENCOUNTER
[facility]    Phone Message    May a detailed message be left on voicemail: no    Reason for Call: Medication Question or concern regarding medication   Prescription Clarification  Name of Medication: Dupixent  Prescribing Provider: Dr. Dixon   Pharmacy: [facility]   What on the order needs clarification? Pharmacy would like a call back to request a change in quanity in the rx.          Action Taken: Message routed to:  [facility] ([facility] Derm

## 2018-06-07 NOTE — TELEPHONE ENCOUNTER
Patient states he will receive medicaiton on Friday and plans to start then. States he spoke with Pharmacy who explained process and suggested injecting with a nurse for the first time. Patient is going to set this up with his PCC as he lives an hour away. He has No further questions at this time and agrees to contact clinic with questions/concerns.

## 2018-06-12 NOTE — TELEPHONE ENCOUNTER
"Writer reached out to patient for questions or concerns. Patient explained that the new medication that he started on Friday [dupilumab] leaves him without an appetite; when he does eat, he has a \"stomach ache for hours.\" He would like to know if this is a normal side effect. Writer explained that someone should be reaching out to you before the end of the day. This message will be routed to the RN on staff. Jennifer Wallace LPN    "

## 2018-06-13 NOTE — TELEPHONE ENCOUNTER
Attempted to call patient, but he was unavailable. Voicemail left. He was informed there are no reports of decreased appetite or abdominal pain associated with dupilumab, though it is still a new medication.     He may consider either taking a short break from dupilumab and see if things improve or continuing and if things do not improve try a break at that point.    He was encouraged to call back with any other questions or concerns.    Chavez Mckeon MD  Dermatology resident, PGY-4  [phone]

## 2018-06-19 ENCOUNTER — OFFICE VISIT (OUTPATIENT)
Dept: DERMATOLOGY | Facility: CLINIC | Age: 23
End: 2018-06-19
Payer: COMMERCIAL

## 2018-06-19 VITALS — SYSTOLIC BLOOD PRESSURE: 137 MMHG | DIASTOLIC BLOOD PRESSURE: 72 MMHG | HEART RATE: 67 BPM

## 2018-06-19 DIAGNOSIS — L63.9 ALOPECIA AREATA: Primary | ICD-10-CM

## 2018-06-19 DIAGNOSIS — L20.9 ATOPIC DERMATITIS, UNSPECIFIED TYPE: ICD-10-CM

## 2018-06-19 ASSESSMENT — PAIN SCALES - GENERAL: PAINLEVEL: NO PAIN (0)

## 2018-06-19 NOTE — LETTER
6/19/2018      RE: Wayne Grigsby  [address] [address]       [facility]ly [facility] Dermatology Note      Dermatology Problem List:  1..Atopic dermatitis -  previously on Methotrexate and Cellcept last on 3/2014; used Elidil, Protopic in the past, Topical streroid creams/ointment currently using intermittently; legs, popliteal fossa, antecubital fossa, buttocks, posterior thigh, groin are trouble spots; currently using TAC 0.1% ointment  Provided dupilimab at last visit and has had a great result with pruritus significantly decreased.  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         Encounter Date: Jun 19, 2018    History of Present Illness:  Mr. Wayne Grigsby is a 23 year old male who returns to follow-up on his atopic dermatitis and alopecia areata. He is now receiving dupilimab every 2 weeks with excellent results. However, there is a persistent eczematous dermatitis on the upper chest and back as well as the lower legs and ankles. Patchy alopecia areata also persists on the scalp. He is using cerave for moisturization. Today we discussed the use of ILK to treat remaining alopecia areata patches and Wayne was in agreement.       Past Medical History:   Patient Active Problem List   Diagnosis     Alopecia areata     Atopic dermatitis, unspecified type     Past Medical History:   Diagnosis Date     ADHD (attention deficit hyperactivity disorder)      Eczema      Past Surgical History:   Procedure Laterality Date     MYRINGOPLASTY      2003     PE TUBES      x3     TONSILLECTOMY & ADENOIDECTOMY      1998, redo adenoidectomy       Social History:  The patient works.    Family History:  Not addressed at this visit.    Medications:  Current Outpatient Prescriptions   Medication Sig Dispense Refill      albuterol (PROAIR HFA, PROVENTIL HFA, VENTOLIN HFA) 108 (90 BASE) MCG/ACT inhaler Inhale 2 puffs into the lungs every 6 hours       clobetasol (TEMOVATE) 0.05 % cream Apply topically daily Use on scalp daily 6 days a week 45 g 3     Dexmethylphenidate HCl (FOCALIN PO) Take 5 mg by mouth daily        doxycycline monohydrate 100 MG capsule Take 1 capsule (100 mg) by mouth daily 30 capsule 1     Dupilumab (DUPIXENT SC)        ketoconazole (NIZORAL) 2 % shampoo Apply to scalp, lather, leave on for a couple of minutes, then rinse, do 2 times per week 120 mL 5     tacrolimus (PROTOPIC) 0.1 % ointment Apply topically 2 times daily 100 g 1     Allergies   Allergen Reactions     Cefzil [Cefprozil] Hives         Review of Systems:  -Constitutional: The patient denies fatigue, fevers, chills, unintended weight loss, and night sweats.  -HEENT: Patient denies nonhealing oral sores.  -Skin: As above in HPI. No additional skin concerns.    Physical exam:  Vitals: /72  Pulse 67  GEN: This is a well developed, well-nourished male in no acute distress, in a pleasant mood.    SKIN: Full skin, which includes the head/face, both arms, chest, back, abdomen,both legs, digits and/or nails, was examined.  -patchy alopecia areata on the scalp  -significant reduction in the number of scaly patches and plaques on the scalp, trunk and legs  --No other lesions of concern on areas examined.     Impression/Plan:  1. Alopecia areata - initial episode in 8/2016 that began with focal hair loss. Did have some improvement with regrowth 1 year ago, but has since had diffuse patchy hair loss. Has also had constant pruritus of scalp with associated scalp dermatitis.    Clobetasol cream to scalp at night to any new patches    ILK 10 today  Kenalog intralesional injection procedure note: After verbal consent and discussion of risks including but not limited to atrophy, pain, and bruising, time out was performed, the patient underwent positioning  and the area was prepped, 3 total cc of Kenalog 10 mg/cc was injected into 30 sites on the scalp..  The patient tolerated the procedure well and left the Dermatology clinic in good condition.      2% ketoconazole shampoo twice weekly     2. Eczematous dermatitis/atopic dermatitis    Continue  bleach baths 2-3/week prn    Triamcinolone 0.1% ointment bid as needed     Continue Protopic as needed    Continue with Ceraave    Continue with dupilimab q 2 weeks    Follow-up 2 months    Isabel Dixon MD  Professor and Chair  Department of Dermatology  [facility]                     Pictures were placed in Pt's chart today for future reference.          Isabel Dixon MD

## 2018-06-19 NOTE — MR AVS SNAPSHOT
After Visit Summary   6/19/2018    Wayne Grigsby    MRN: [id number]           Patient Information     Date Of Birth          1995        Visit Information        Provider Department      6/19/2018 2:30 PM Isabel Dixon MD [facility] Dermatology         Follow-ups after your visit        Your next 10 appointments already scheduled     Aug 21, 2018  3:45 PM CDT   (Arrive by 3:30 PM)   RETURN [facility] with MD [facility] Yoo [facility] Dermatology ([facility])    [address] [address] [phone]              Who to contact     Please call your clinic at [phone] to:    Ask questions about your health    Make or cancel appointments    Discuss your medicines    Learn about your test results    Speak to your doctor            Additional Information About Your Visit        Care EveryWhere ID     This is your Care EveryWhere ID. This could be used by other organizations to access your [place] medical records  [id number]        Your Vitals Were     Pulse                   67            Blood Pressure from Last 3 Encounters:   06/19/18 137/72   05/07/18 116/67   02/06/17 115/68    Weight from Last 3 Encounters:   No data found for Wt              Today, you had the following     No orders found for display       Primary Care Provider Office Phone # Fax #    [facility] [phone] [address] [address]        Equal Access to Services     ANA TESFAYE : Hadii kelli beck hadasho Soaashish, waaxda luqadaha, qaybta kaalmada adeedgaryada, greg mina. So [facility] [phone].    ATENCIÓN: Si habla español, tiene a kelly disposición servicios gratuitos de asistencia lingüística. Llame al [phone].    We comply with applicable federal civil rights laws and [place] laws. We do not discriminate on the basis of race, color, national origin,  age, disability, sex, sexual orientation, or gender identity.            Thank you!     Thank you for choosing [facility] DERMATOLOGY  for your care. Our goal is always to provide you with excellent care. Hearing back from our patients is one way we can continue to improve our services. Please take a few minutes to complete the written survey that you may receive in the mail after your visit with us. Thank you!             Your Updated Medication List - Protect others around you: Learn how to safely use, store and throw away your medicines at www.disposemymeds.org.          This list is accurate as of 6/19/18  4:08 PM.  Always use your most recent med list.                   Brand Name Dispense Instructions for use Diagnosis    albuterol 108 (90 Base) MCG/ACT Inhaler    PROAIR HFA/PROVENTIL HFA/VENTOLIN HFA     Inhale 2 puffs into the lungs every 6 hours        clobetasol 0.05 % cream    TEMOVATE    45 g    Apply topically daily Use on scalp daily 6 days a week    Atopic dermatitis, unspecified type       doxycycline monohydrate 100 MG capsule     30 capsule    Take 1 capsule (100 mg) by mouth daily    Atopic dermatitis, unspecified type       DUPIXENT SC           FOCALIN PO      Take 5 mg by mouth daily        ketoconazole 2 % shampoo    NIZORAL    120 mL    Apply to scalp, lather, leave on for a couple of minutes, then rinse, do 2 times per week    Dermatitis, seborrheic       tacrolimus 0.1 % ointment    PROTOPIC    100 g    Apply topically 2 times daily    Atopic dermatitis, unspecified type

## 2018-06-19 NOTE — NURSING NOTE
Drug Administration Record    Drug Name: triamcinolone acetonide(kenalog)  Dose: 3mL of triamcinolone 10mg/mL, 30mg dose  Route administered: ID  NDC #: Kenalog-10 (4915-7544-34)  Amount of waste(mL):2  Reason for waste: Single use vial    LOT #: [id number]   SITE: [facility]  : [employer]  EXPIRATION DATE: DEC2019

## 2018-06-19 NOTE — LETTER
6/19/2018       RE: Wayne Grigsby  [address] [address]     Dear Colleague,    Thank you for referring your patient, Wayne Grigsby, to the [facility] DERMATOLOGY at [facility]. Please see a copy of my visit note below.    [facility] Dermatology Note      Dermatology Problem List:  1..Atopic dermatitis -  previously on Methotrexate and Cellcept last on 3/2014; used Elidil, Protopic in the past, Topical streroid creams/ointment currently using intermittently; legs, popliteal fossa, antecubital fossa, buttocks, posterior thigh, groin are trouble spots; currently using TAC 0.1% ointment  Provided dupilimab at last visit and has had a great result with pruritus significantly decreased.  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         Encounter Date: Jun 19, 2018    History of Present Illness:  Mr. Wayne Grigsby is a 23 year old male who returns to follow-up on his atopic dermatitis and alopecia areata. He is now receiving dupilimab every 2 weeks with excellent results. However, there is a persistent eczematous dermatitis on the upper chest and back as well as the lower legs and ankles. Patchy alopecia areata also persists on the scalp. He is using cerave for moisturization. Today we discussed the use of ILK to treat remaining alopecia areata patches and Wayne was in agreement.       Past Medical History:   Patient Active Problem List   Diagnosis     Alopecia areata     Atopic dermatitis, unspecified type     Past Medical History:   Diagnosis Date     ADHD (attention deficit hyperactivity disorder)      Eczema      Past Surgical History:   Procedure Laterality Date     MYRINGOPLASTY      2003     PE TUBES      x3     TONSILLECTOMY & ADENOIDECTOMY       1998, redo adenoidectomy       Social History:  The patient works.    Family History:  Not addressed at this visit.    Medications:  Current Outpatient Prescriptions   Medication Sig Dispense Refill     albuterol (PROAIR HFA, PROVENTIL HFA, VENTOLIN HFA) 108 (90 BASE) MCG/ACT inhaler Inhale 2 puffs into the lungs every 6 hours       clobetasol (TEMOVATE) 0.05 % cream Apply topically daily Use on scalp daily 6 days a week 45 g 3     Dexmethylphenidate HCl (FOCALIN PO) Take 5 mg by mouth daily        doxycycline monohydrate 100 MG capsule Take 1 capsule (100 mg) by mouth daily 30 capsule 1     Dupilumab (DUPIXENT SC)        ketoconazole (NIZORAL) 2 % shampoo Apply to scalp, lather, leave on for a couple of minutes, then rinse, do 2 times per week 120 mL 5     tacrolimus (PROTOPIC) 0.1 % ointment Apply topically 2 times daily 100 g 1     Allergies   Allergen Reactions     Cefzil [Cefprozil] Hives         Review of Systems:  -Constitutional: The patient denies fatigue, fevers, chills, unintended weight loss, and night sweats.  -HEENT: Patient denies nonhealing oral sores.  -Skin: As above in HPI. No additional skin concerns.    Physical exam:  Vitals: /72  Pulse 67  GEN: This is a well developed, well-nourished male in no acute distress, in a pleasant mood.    SKIN: Full skin, which includes the head/face, both arms, chest, back, abdomen,both legs, digits and/or nails, was examined.  -patchy alopecia areata on the scalp  -significant reduction in the number of scaly patches and plaques on the scalp, trunk and legs  --No other lesions of concern on areas examined.     Impression/Plan:  1. Alopecia areata - initial episode in 8/2016 that began with focal hair loss. Did have some improvement with regrowth 1 year ago, but has since had diffuse patchy hair loss. Has also had constant pruritus of scalp with associated scalp dermatitis.    Clobetasol cream to scalp at night to any new patches    ILK 10 today  Kenalog  intralesional injection procedure note: After verbal consent and discussion of risks including but not limited to atrophy, pain, and bruising, time out was performed, the patient underwent positioning and the area was prepped, 3 total cc of Kenalog 10 mg/cc was injected into 30 sites on the scalp..  The patient tolerated the procedure well and left the Dermatology clinic in good condition.      2% ketoconazole shampoo twice weekly     2. Eczematous dermatitis/atopic dermatitis    Continue  bleach baths 2-3/week prn    Triamcinolone 0.1% ointment bid as needed     Continue Protopic as needed    Continue with Ceraave    Continue with dupilimab q 2 weeks    Follow-up 2 months    Isabel Dixon MD  Professor and Chair  Department of Dermatology  [facility]                     Pictures were placed in Pt's chart today for future reference.          Again, thank you for allowing me to participate in the care of your patient.      Sincerely,    Isabel Dixon MD

## 2018-06-19 NOTE — PROGRESS NOTES
[facility] Dermatology Note      Dermatology Problem List:  1.Eczema- previously on Methotrexate and Cellcept last on 3/2014; used Elidil, Protopic in the past, Topical streroid creams/ointment currently using intermittently; legs, popliteal fossa, antecubital fossa, buttocks, posterior thigh, groin are trouble spots; currently using TAC 0.1% ointment  2.Alopeicia areata- onset 6/2016, IL Kenalog injections x2, Clobetasol 0.05% BID 2 wks on / 1 wk off, previously used Desonide 0.05%    Encounter Date: Jun 19, 2018    CC:   Chief Complaint   Patient presents with     Hair/Scalp Problem     Wayne is returning to discuss hair and new medication.         History of Present Illness:  Mr. Wayne Grigsby is a 23 year old male who ***    Past Medical History:   Patient Active Problem List   Diagnosis     Alopecia areata     Atopic dermatitis, unspecified type     Past Medical History:   Diagnosis Date     ADHD (attention deficit hyperactivity disorder)      Eczema      Past Surgical History:   Procedure Laterality Date     MYRINGOPLASTY      2003     PE TUBES      x3     TONSILLECTOMY & ADENOIDECTOMY      1998, redo adenoidectomy         Medications:  Current Outpatient Prescriptions   Medication Sig Dispense Refill     albuterol (PROAIR HFA, PROVENTIL HFA, VENTOLIN HFA) 108 (90 BASE) MCG/ACT inhaler Inhale 2 puffs into the lungs every 6 hours       clobetasol (TEMOVATE) 0.05 % cream Apply topically daily Use on scalp daily 6 days a week 45 g 3     Dexmethylphenidate HCl (FOCALIN PO) Take 5 mg by mouth daily        doxycycline monohydrate 100 MG capsule Take 1 capsule (100 mg) by mouth daily 30 capsule 1     Dupilumab (DUPIXENT SC)        ketoconazole (NIZORAL) 2 % shampoo Apply to scalp, lather, leave on for a couple of minutes, then rinse, do 2 times per week 120 mL 5     tacrolimus (PROTOPIC) 0.1 % ointment Apply topically 2 times daily 100 g 1        Allergies   Allergen Reactions     Cefzil  [Cefprozil] Hives         Review of Systems:  -As per HPI  -Constitutional: The patient denies fatigue, fevers, chills, unintended weight loss, and night sweats.  -HEENT: Patient denies nonhealing oral sores.  -Skin: As above in HPI. No additional skin concerns.    Physical exam:  Vitals: /72  Pulse 67  GEN: This is a well developed, well-nourished patient in no acute distress, in a pleasant mood.    SKIN: {Skin Exam:615605}  -No other lesions of concern on areas examined.     Impression/Plan:  1. {Diagnosesderm:066012}    {dermmedicalstudent:342005}    ***    2. {Diagnosesderm:730959}    {dermmedicalstudent:380242}    ***    Seen with staff    Staff Involved:  Resident(Omero Thao)/Staff(as above)  Omero Thao PGY-3  Plastic Surgery

## 2018-06-19 NOTE — NURSING NOTE
Dermatology Rooming Note    Wayne Grigsby's goals for this visit include:   Chief Complaint   Patient presents with     Hair/Scalp Problem     Wayne is returning to discuss hair and new medication.     Jennifer Wallace LPN

## 2018-06-20 NOTE — PROGRESS NOTES
[facility] Dermatology Note      Dermatology Problem List:  1..Atopic dermatitis -  previously on Methotrexate and Cellcept last on 3/2014; used Elidil, Protopic in the past, Topical streroid creams/ointment currently using intermittently; legs, popliteal fossa, antecubital fossa, buttocks, posterior thigh, groin are trouble spots; currently using TAC 0.1% ointment  Provided dupilimab at last visit and has had a great result with pruritus significantly decreased.  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         Encounter Date: Jun 19, 2018    History of Present Illness:  Mr. Wayne Grigsby is a 23 year old male who returns to follow-up on his atopic dermatitis and alopecia areata. He is now receiving dupilimab every 2 weeks with excellent results. However, there is a persistent eczematous dermatitis on the upper chest and back as well as the lower legs and ankles. Patchy alopecia areata also persists on the scalp. He is using cerave for moisturization. Today we discussed the use of ILK to treat remaining alopecia areata patches and Wayne was in agreement.       Past Medical History:   Patient Active Problem List   Diagnosis     Alopecia areata     Atopic dermatitis, unspecified type     Past Medical History:   Diagnosis Date     ADHD (attention deficit hyperactivity disorder)      Eczema      Past Surgical History:   Procedure Laterality Date     MYRINGOPLASTY      2003     PE TUBES      x3     TONSILLECTOMY & ADENOIDECTOMY      1998, redo adenoidectomy       Social History:  The patient works.    Family History:  Not addressed at this visit.    Medications:  Current Outpatient Prescriptions   Medication Sig Dispense Refill     albuterol (PROAIR HFA, PROVENTIL HFA, VENTOLIN HFA) 108 (90 BASE) MCG/ACT  inhaler Inhale 2 puffs into the lungs every 6 hours       clobetasol (TEMOVATE) 0.05 % cream Apply topically daily Use on scalp daily 6 days a week 45 g 3     Dexmethylphenidate HCl (FOCALIN PO) Take 5 mg by mouth daily        doxycycline monohydrate 100 MG capsule Take 1 capsule (100 mg) by mouth daily 30 capsule 1     Dupilumab (DUPIXENT SC)        ketoconazole (NIZORAL) 2 % shampoo Apply to scalp, lather, leave on for a couple of minutes, then rinse, do 2 times per week 120 mL 5     tacrolimus (PROTOPIC) 0.1 % ointment Apply topically 2 times daily 100 g 1     Allergies   Allergen Reactions     Cefzil [Cefprozil] Hives         Review of Systems:  -Constitutional: The patient denies fatigue, fevers, chills, unintended weight loss, and night sweats.  -HEENT: Patient denies nonhealing oral sores.  -Skin: As above in HPI. No additional skin concerns.    Physical exam:  Vitals: /72  Pulse 67  GEN: This is a well developed, well-nourished male in no acute distress, in a pleasant mood.    SKIN: Full skin, which includes the head/face, both arms, chest, back, abdomen,both legs, digits and/or nails, was examined.  -patchy alopecia areata on the scalp  -significant reduction in the number of scaly patches and plaques on the scalp, trunk and legs  --No other lesions of concern on areas examined.     Impression/Plan:  1. Alopecia areata - initial episode in 8/2016 that began with focal hair loss. Did have some improvement with regrowth 1 year ago, but has since had diffuse patchy hair loss. Has also had constant pruritus of scalp with associated scalp dermatitis.    Clobetasol cream to scalp at night to any new patches    ILK 10 today  Kenalog intralesional injection procedure note: After verbal consent and discussion of risks including but not limited to atrophy, pain, and bruising, time out was performed, the patient underwent positioning and the area was prepped, 3 total cc of Kenalog 10 mg/cc was injected into 30  sites on the scalp..  The patient tolerated the procedure well and left the Dermatology clinic in good condition.      2% ketoconazole shampoo twice weekly     2. Eczematous dermatitis/atopic dermatitis    Continue  bleach baths 2-3/week prn    Triamcinolone 0.1% ointment bid as needed     Continue Protopic as needed    Continue with Ceraave    Continue with dupilimab q 2 weeks    Follow-up 2 months    Isabel Dixon MD  Professor and Chair  Department of Dermatology  [facility]

## 2018-07-16 DIAGNOSIS — L20.84 INTRINSIC ATOPIC DERMATITIS: ICD-10-CM

## 2018-07-17 NOTE — TELEPHONE ENCOUNTER
Dupilumab (DUPIXENT) 300 MG/2ML syringe      Last Written Prescription Date:  6/4/18-6/4/18  Last Fill Quantity: 10 ml,   # refills: 0  Last Office Visit : 6/19/18  Future Office visit:  8/21/18    Routing refill request to provider for review/approval because:  Drug not active on patient's medication list  Drug not on the Derm refill protocol.    Plan @ 6/19/18 visit:     Eczematous dermatitis/atopic dermatitis: Continue with dupilimab q 2 weeks

## 2018-07-17 NOTE — TELEPHONE ENCOUNTER
Received refill request for dupilumab as the resident on call. Reviewed patient's chart and attached communication. Patient last seen 6/19/18 for eczema and alopecia areata. RTC 2 months. After reviewing the medication list and assessment and plan from last visit, the refill request was accepted.    Richmond Moran MD  Dermatology Resident, PGY3

## 2018-10-18 ENCOUNTER — TELEPHONE (OUTPATIENT)
Dept: DERMATOLOGY | Facility: CLINIC | Age: 23
End: 2018-10-18

## 2018-10-18 NOTE — TELEPHONE ENCOUNTER
[facility] Call Center    Phone Message    May a detailed message be left on voicemail: yes    Reason for Call: Other: Pharmacy called to make us aware that Pt has not refiled his Rx for Dupixent in 6 months.  They have shipped him meds today and advised him to schedule a vist with provider to update meds but are concerned this Pt is non-compliant with meds and may be with scheduling too.  Wanted us to have the information. call back only if you have questions.     Action Taken: Message routed to:  [facility] ([facility] dermatology

## 2019-03-11 ENCOUNTER — OFFICE VISIT (OUTPATIENT)
Dept: DERMATOLOGY | Facility: CLINIC | Age: 24
End: 2019-03-11
Payer: COMMERCIAL

## 2019-03-11 VITALS — HEART RATE: 62 BPM | SYSTOLIC BLOOD PRESSURE: 122 MMHG | DIASTOLIC BLOOD PRESSURE: 70 MMHG

## 2019-03-11 DIAGNOSIS — L20.9 ATOPIC DERMATITIS, UNSPECIFIED TYPE: ICD-10-CM

## 2019-03-11 DIAGNOSIS — L63.9 ALOPECIA AREATA: Primary | ICD-10-CM

## 2019-03-11 RX ORDER — PAROXETINE 10 MG/1
20 TABLET, FILM COATED ORAL EVERY MORNING
COMMUNITY

## 2019-03-11 ASSESSMENT — PAIN SCALES - GENERAL: PAINLEVEL: NO PAIN (0)

## 2019-03-11 NOTE — LETTER
3/11/2019       RE: Wayne Grigsby  [address] [address]     Dear Colleague,    Thank you for referring your patient, Wayne Grigsby, to the [facility] DERMATOLOGY at [facility]. Please see a copy of my visit note below.    [facility] Dermatology Note      Dermatology Problem List:  1..Atopic dermatitis -Legs, popliteal fossa, antecubital fossa, buttocks, posterior thigh, groin are trouble spots  -Topical streroid creams/ointment currently using daily;currently using TMC 0.1% ointment  -Dupilimab effective but reports some  diarrhea, abdominal pain, and fatigue; encouraged to take every 2 weeks.  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     Encounter Date: Mar 11, 2019    CC:  Chief Complaint   Patient presents with     Derm Problem     A A follow up, Ashwin notes hair growth.         History of Present Illness:  Mr. Wayne Grigsby is a 23 year old male who presents as a follow-up for alopecia areata and atopic dermatitis. The patient was last seen June 19, 2018 when he received ILK for his alopecia areata.     His alopecia has improved since his last appointment in June but he notes some involvement still  on his occipital and parietal scalp. Currently he uses his shampoo every once in a while but he does not like the smell.  He denies pruitus, burning, and pain.    He reports a flare in his atopic dermatitis a few weeks ago. He has not taken his Dupilimab since last June. He is having bad abdominal pain, diarrhea, and fatigue when he takes his injection so avoids using it unless he needs to. His flare resolved with restarting the dupilimab. He uses his Tramcinolone ointment daily as well as his lotion. He does not do bleach baths.      Past Medical History:   Patient Active Problem List   Diagnosis     Alopecia areata     Atopic  dermatitis, unspecified type     Past Medical History:   Diagnosis Date     ADHD (attention deficit hyperactivity disorder)      Eczema      Past Surgical History:   Procedure Laterality Date     MYRINGOPLASTY      2003     PE TUBES      x3     TONSILLECTOMY & ADENOIDECTOMY      1998, redo adenoidectomy       Social History:  Patient reports that  has never smoked. he has never used smokeless tobacco.    Family History:  Family History   Problem Relation Age of Onset     Arthritis Sister         DEBRA     Hypothyroidism Sister         Hashimoto's     Hypothyroidism Sister         Hashimoto's     Asthma Mother      Heart Disease Mother         Caban- Parkinson White     Seasonal/Environmental Allergies Mother      Rheumatoid Arthritis Maternal Aunt      Multiple Sclerosis Maternal Aunt      Seasonal/Environmental Allergies Brother      Seasonal/Environmental Allergies Sister      Seasonal/Environmental Allergies Sister      Seasonal/Environmental Allergies Father      Asthma Brother      Eczema Brother      Skin Cancer No family hx of      Melanoma No family hx of        Medications:  Current Outpatient Medications   Medication Sig Dispense Refill     albuterol (PROAIR HFA, PROVENTIL HFA, VENTOLIN HFA) 108 (90 BASE) MCG/ACT inhaler Inhale 2 puffs into the lungs every 6 hours       Dupilumab (DUPIXENT) 300 MG/2ML syringe Inject 2 mLs (300 mg) Subcutaneous every 14 days 4 mL 1     ketoconazole (NIZORAL) 2 % shampoo Apply to scalp, lather, leave on for a couple of minutes, then rinse, do 2 times per week 120 mL 5     PARoxetine (PAXIL) 10 MG tablet Take 20 mg by mouth every morning       tacrolimus (PROTOPIC) 0.1 % ointment Apply topically 2 times daily 100 g 1     clobetasol (TEMOVATE) 0.05 % cream Apply topically daily Use on scalp daily 6 days a week (Patient not taking: Reported on 3/11/2019) 45 g 3     Dexmethylphenidate HCl (FOCALIN PO) Take 5 mg by mouth daily        Dupilumab (DUPIXENT SC)        triamcinolone  acetonide (KENALOG) 10 MG/ML injection See med note (Patient not taking: Reported on 3/11/2019) 5 mL 0     Allergies   Allergen Reactions     Cefzil [Cefprozil] Hives         Review of Systems:  -Constitutional: Otherwise feeling well today, in usual state of health.  -Skin: As above in HPI. No additional skin concerns.    Physical exam:  Vitals: /70   Pulse 62   GEN: This is a well developed, well-nourished male in no acute distress, in a pleasant mood.    SKIN: Waist-up skin, which includes the head/face, neck, both arms, chest, back, and abdomen.  -Markedly improved patchy hair loss now only present in the parietal and occipital scalp - see photographs  -Itchy plaques with mild erythema involving the scalp, back, abdomen, and flexor surface of arms   -Legs with improved plaques in flexor surface  -No other lesions of concern on areas examined.     Impression/Plan:  1. Alopecia areata    Kenalog intralesional injection procedure note (performed by faculty): After verbal consent and discussion of risks including but not limited to atrophy, pain, and bruising,  time out was performed, 2 total cc of Kenalog 10 mg/cc was injected into 20 sites on the scalp.  The patient tolerated the procedure well and left the Dermatology clinic in good condition.    Continue using clobetasol 0.05% and other anti-dandruff shampoo needed    2. Atopic dermatitis    Dupilumab 300 mg Every Two weeks    Use Triamcinolone 0.1% ointment sparingly to active spots    Continue moisturizing with Ceraave    [place] [address] on close of this encounter.  Follow-up in 3 months, earlier for new or changing lesions.     Staff Involved:  I, Maira Will, MS4, saw and examined the patient in the presence of Dr. Dixon.    Staff Physician:  I was present with the medical student who participated in the service and in the documentation of the note. I have verified the history and  personally performed the physical exam and medical decision making. I agree with the assessment and plan of care as documented in the note.     Isabel Dixon MD  Professor and Chair  Department of Dermatology  [facility]: Phone: [phone], Fax:[phone]  [facility]: Phone: [phone], Fax: [phone]             Drug Administration Record    Prior to injection, verified patient identity using patient's name and date of birth.  Due to injection administration, patient instructed to remain in clinic for 15 minutes  afterwards, and to report any adverse reaction to me immediately.    Drug Name: triamcinolone acetonide(kenalog)  Dose: 2mL of triamcinolone 10mg/mL, 20mg dose  Route administered: ID  NDC #: [id number]: Kenalog-10 (9633-3143-81)  Amount of waste(mL):3  Reason for waste: Multi dose vial used as single use    LOT #: [id number]  SITE: see note  : [employer]  EXPIRATION DATE: 9/2020      Again, thank you for allowing me to participate in the care of your patient.      Sincerely,    Isabel Dixon MD

## 2019-03-11 NOTE — PROGRESS NOTES
Drug Administration Record    Prior to injection, verified patient identity using patient's name and date of birth.  Due to injection administration, patient instructed to remain in clinic for 15 minutes  afterwards, and to report any adverse reaction to me immediately.    Drug Name: triamcinolone acetonide(kenalog)  Dose: 2mL of triamcinolone 10mg/mL, 20mg dose  Route administered: ID  NDC #: [id number]: Kenalog-10 (2812-9381-20)  Amount of waste(mL):3  Reason for waste: Multi dose vial used as single use    LOT #: [id number]  SITE: see note  : [employer]  EXPIRATION DATE: 9/2020

## 2019-03-11 NOTE — NURSING NOTE
Dermatology Rooming Note    Wayne Grigsby's goals for this visit include:   Chief Complaint   Patient presents with     Derm Problem     A A follow up, Ashwin notes hair growth.     Jade Richards LPN

## 2019-03-11 NOTE — PROGRESS NOTES
[facility] Dermatology Note      Dermatology Problem List:  1..Atopic dermatitis -Legs, popliteal fossa, antecubital fossa, buttocks, posterior thigh, groin are trouble spots  -Topical streroid creams/ointment currently using daily;currently using TMC 0.1% ointment  -Dupilimab effective but reports some  diarrhea, abdominal pain, and fatigue; encouraged to take every 2 weeks.  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     Encounter Date: Mar 11, 2019    CC:  Chief Complaint   Patient presents with     Derm Problem     A A follow up, Ashwin notes hair growth.         History of Present Illness:  Mr. Wayne Grigsby is a 23 year old male who presents as a follow-up for alopecia areata and atopic dermatitis. The patient was last seen June 19, 2018 when he received ILK for his alopecia areata.     His alopecia has improved since his last appointment in June but he notes some involvement still  on his occipital and parietal scalp. Currently he uses his shampoo every once in a while but he does not like the smell.  He denies pruitus, burning, and pain.    He reports a flare in his atopic dermatitis a few weeks ago. He has not taken his Dupilimab since last June. He is having bad abdominal pain, diarrhea, and fatigue when he takes his injection so avoids using it unless he needs to. His flare resolved with restarting the dupilimab. He uses his Tramcinolone ointment daily as well as his lotion. He does not do bleach baths.      Past Medical History:   Patient Active Problem List   Diagnosis     Alopecia areata     Atopic dermatitis, unspecified type     Past Medical History:   Diagnosis Date     ADHD (attention deficit hyperactivity disorder)      Eczema      Past Surgical History:   Procedure Laterality Date     MYRINGOPLASTY      2003     PE TUBES      x3     TONSILLECTOMY & ADENOIDECTOMY      1998,  redo adenoidectomy       Social History:  Patient reports that  has never smoked. he has never used smokeless tobacco.    Family History:  Family History   Problem Relation Age of Onset     Arthritis Sister         DEBRA     Hypothyroidism Sister         Hashimoto's     Hypothyroidism Sister         Hashimoto's     Asthma Mother      Heart Disease Mother         Caban- Parkinson White     Seasonal/Environmental Allergies Mother      Rheumatoid Arthritis Maternal Aunt      Multiple Sclerosis Maternal Aunt      Seasonal/Environmental Allergies Brother      Seasonal/Environmental Allergies Sister      Seasonal/Environmental Allergies Sister      Seasonal/Environmental Allergies Father      Asthma Brother      Eczema Brother      Skin Cancer No family hx of      Melanoma No family hx of        Medications:  Current Outpatient Medications   Medication Sig Dispense Refill     albuterol (PROAIR HFA, PROVENTIL HFA, VENTOLIN HFA) 108 (90 BASE) MCG/ACT inhaler Inhale 2 puffs into the lungs every 6 hours       Dupilumab (DUPIXENT) 300 MG/2ML syringe Inject 2 mLs (300 mg) Subcutaneous every 14 days 4 mL 1     ketoconazole (NIZORAL) 2 % shampoo Apply to scalp, lather, leave on for a couple of minutes, then rinse, do 2 times per week 120 mL 5     PARoxetine (PAXIL) 10 MG tablet Take 20 mg by mouth every morning       tacrolimus (PROTOPIC) 0.1 % ointment Apply topically 2 times daily 100 g 1     clobetasol (TEMOVATE) 0.05 % cream Apply topically daily Use on scalp daily 6 days a week (Patient not taking: Reported on 3/11/2019) 45 g 3     Dexmethylphenidate HCl (FOCALIN PO) Take 5 mg by mouth daily        Dupilumab (DUPIXENT SC)        triamcinolone acetonide (KENALOG) 10 MG/ML injection See med note (Patient not taking: Reported on 3/11/2019) 5 mL 0     Allergies   Allergen Reactions     Cefzil [Cefprozil] Hives         Review of Systems:  -Constitutional: Otherwise feeling well today, in usual state of health.  -Skin: As above in  HPI. No additional skin concerns.    Physical exam:  Vitals: /70   Pulse 62   GEN: This is a well developed, well-nourished male in no acute distress, in a pleasant mood.    SKIN: Waist-up skin, which includes the head/face, neck, both arms, chest, back, and abdomen.  -Markedly improved patchy hair loss now only present in the parietal and occipital scalp - see photographs  -Itchy plaques with mild erythema involving the scalp, back, abdomen, and flexor surface of arms   -Legs with improved plaques in flexor surface  -No other lesions of concern on areas examined.     Impression/Plan:  1. Alopecia areata    Kenalog intralesional injection procedure note (performed by faculty): After verbal consent and discussion of risks including but not limited to atrophy, pain, and bruising,  time out was performed, 2 total cc of Kenalog 10 mg/cc was injected into 20 sites on the scalp.  The patient tolerated the procedure well and left the Dermatology clinic in good condition.    Continue using clobetasol 0.05% and other anti-dandruff shampoo needed    2. Atopic dermatitis    Dupilumab 300 mg Every Two weeks    Use Triamcinolone 0.1% ointment sparingly to active spots    Continue moisturizing with Ceraave    [facility] [place] [address] on close of this encounter.  Follow-up in 3 months, earlier for new or changing lesions.     Staff Involved:  I, Maira Will, MS4, saw and examined the patient in the presence of Dr. Dixon.    Staff Physician:  I was present with the medical student who participated in the service and in the documentation of the note. I have verified the history and personally performed the physical exam and medical decision making. I agree with the assessment and plan of care as documented in the note.     Isabel Dixon MD  Professor and Chair  Department of Dermatology  [facility]:  Phone: [phone], Fax:[phone]  [facility]: Phone: [phone], Fax: [phone]

## 2019-03-11 NOTE — PATIENT INSTRUCTIONS
Alopecia:Steroid injection today. Continue using alopecia shampoo and can use other anti-dandruff shampoo as well (Head, Knees, and Shoulders for example).    Atopic dermatitis: Dupilimab every two weeks. Continue using lotion. Use steroid cream sparingly. \    Follow-up in 3-4 months for alopecia.

## 2019-05-15 ENCOUNTER — TELEPHONE (OUTPATIENT)
Dept: DERMATOLOGY | Facility: CLINIC | Age: 24
End: 2019-05-15

## 2019-05-15 DIAGNOSIS — L20.84 INTRINSIC ATOPIC DERMATITIS: ICD-10-CM

## 2019-05-15 NOTE — TELEPHONE ENCOUNTER
Received refill request for dupilimab as the resident on call. Reviewed patient's chart and attached communication. Patient last seen 3/14/19 for alopecia areata and atopic dermatitis. RTC scheduled for 7/1/19. After reviewing the medication list and assessment and plan from last visit, the refill request was accepted. Pt has history of medication compliance issues with this medication d/t side effects -- do not recommend additional refills until he is seen again in clinic.    Henrry Gooden MD  Dermatology Resident, PGY4  Pager: [phone]

## 2019-05-15 NOTE — TELEPHONE ENCOUNTER
[facility] Call Center    Phone Message    May a detailed message be left on voicemail: yes    Reason for Call: Medication Refill Request    Has the patient contacted the pharmacy for the refill? Yes   Name of medication being requested: Dupilumab (DUPIXENT) 300 MG/2ML syringe     Provider who prescribed the medication: Richmond Moran MD  Pharmacy: Please call Pt to discuss Pharmacy  Date medication is needed: ASAP - Pt has been out and is suffering from a major outbreak     Please refill medication and call Pt to discuss the choice of pharmacies.    Action Taken: Message routed to:  [facility] ([facility] dermatology

## 2019-07-19 ENCOUNTER — TELEPHONE (OUTPATIENT)
Dept: DERMATOLOGY | Facility: CLINIC | Age: 24
End: 2019-07-19

## 2019-07-19 DIAGNOSIS — L20.84 INTRINSIC ATOPIC DERMATITIS: ICD-10-CM

## 2019-07-19 NOTE — TELEPHONE ENCOUNTER
Refill request received for dupixent. Will route to [facility] as this is a specialty med    LOV 3/11/2019, Does not have upcoming appt scheduled.    Impression/Plan:  1. Alopecia areata    Kenalog intralesional injection procedure note (performed by faculty): After verbal consent and discussion of risks including but not limited to atrophy, pain, and bruising,  time out was performed, 2 total cc of Kenalog 10 mg/cc was injected into 20 sites on the scalp.  The patient tolerated the procedure well and left the Dermatology clinic in good condition.    Continue using clobetasol 0.05% and other anti-dandruff shampoo needed     2. Atopic dermatitis    Dupilumab 300 mg Every Two weeks    Use Triamcinolone 0.1% ointment sparingly to active spots    Continue moisturizing with Ceraave

## 2019-07-19 NOTE — TELEPHONE ENCOUNTER
My name is Dr. Celso Aguilar, and I am the dermatology resident on call.    This LIMITED refill request of dupilumab 300 mg q14d has been approved as a courtesy to the patient for 2 months. The patient should be seen as described in the last clinic note within 1-2 months as this is overdue now with Dr. Dixon (or another physician, if necessary). Future refill requests may not be approved without a clinic appointment. A message to the dermatology schedulers has been sent to facilitate scheduling the patient for the follow up appointment.

## 2019-07-23 ENCOUNTER — TELEPHONE (OUTPATIENT)
Dept: DERMATOLOGY | Facility: CLINIC | Age: 24
End: 2019-07-23

## 2019-07-23 NOTE — TELEPHONE ENCOUNTER
Per Dr. Aguilar's ([facility]) request, writer reached out to schedule [facility] appt with Dr. Dixon in order to be prescribed medication in the future.  Call center number was left to assist in scheduling.

## 2020-11-17 ENCOUNTER — OFFICE VISIT (OUTPATIENT)
Dept: DERMATOLOGY | Facility: CLINIC | Age: 25
End: 2020-11-17
Payer: COMMERCIAL

## 2020-11-17 DIAGNOSIS — L21.9 DERMATITIS, SEBORRHEIC: ICD-10-CM

## 2020-11-17 DIAGNOSIS — L63.9 ALOPECIA AREATA: Primary | ICD-10-CM

## 2020-11-17 PROCEDURE — 99213 OFFICE O/P EST LOW 20 MIN: CPT | Mod: 25 | Performed by: DERMATOLOGY

## 2020-11-17 PROCEDURE — 11901 INJECT SKIN LESIONS >7: CPT | Performed by: DERMATOLOGY

## 2020-11-17 RX ORDER — KETOCONAZOLE 20 MG/ML
SHAMPOO TOPICAL
Qty: 120 ML | Refills: 5 | Status: SHIPPED | OUTPATIENT
Start: 2020-11-17

## 2020-11-17 RX ORDER — CLOBETASOL PROPIONATE 0.05 G/100ML
SHAMPOO TOPICAL
Qty: 118 ML | Refills: 11 | Status: SHIPPED | OUTPATIENT
Start: 2020-11-17

## 2020-11-17 ASSESSMENT — PAIN SCALES - GENERAL
PAINLEVEL: NO PAIN (0)
PAINLEVEL: NO PAIN (1)

## 2020-11-17 NOTE — LETTER
11/17/2020       RE: Wayne Grigsby  [address] [address]     Dear Colleague,    Thank you for referring your patient, Wayne Grigsby, to the [facility] DERMATOLOGY [facility] [place] at [facility]. Please see a copy of my visit note below.    [facility] Dermatology Note      Dermatology Problem List:  1. Atopic dermatitis -Legs, popliteal fossa, antecubital fossa, buttocks, posterior thigh, groin are trouble spots  -Topical streroid creams/ointment currently using daily;currently using TMC 0.1% ointment  -Dupilimab effective but reports some  diarrhea, abdominal pain, and fatigue; encouraged to take every 2 weeks.  -Previously on Methotrexate and Cellcept last on 3/2014; used Elidil, Protopic in the past  2. 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     CC:   Chief Complaint   Patient presents with     Derm Problem     Wayne is here for a hairloss follow up, states it's better.      Encounter Date: Nov 17, 2020    History of Present Illness:  Mr. Wayne Grigsby is a 25 year old male who presents as a follow-up for alopecia areata. The patient was last seen 3/2019 when he had ILK injections and continued on clobex shampoo. Dupixent also continued for atopic dermatitis.     Today, he reports a few new alopecia spots on his occipital scalp, vertex scalp, and right postauricular area. No eyebrow, eyelash or body hair changes. He states he has had intermittent flares over the last few years, typically after receiving haircuts. He states his most recent flare was noted around the time of a recent haircut. He has had some regrowth in the spot on the vertex of the scalp, but no significant changes to the areas on the occipital scalp. He has not had ILK injections since last year. He also requests refill on his ketoconazole 2% shampoo.     He has also followed in the past for atopic dermatitis. He states he discontinued  "dupixent injections and has just been using topical emollients, including \"breast milk\" lotion from his financee. Health otherwise stable. No other skin concerns.     Past Medical History:   Patient Active Problem List   Diagnosis     Alopecia areata     Atopic dermatitis, unspecified type     Past Medical History:   Diagnosis Date     ADHD (attention deficit hyperactivity disorder)      Eczema      Past Surgical History:   Procedure Laterality Date     MYRINGOPLASTY      2003     PE TUBES      x3     TONSILLECTOMY & ADENOIDECTOMY      1998, redo adenoidectomy       Social History:  Patient reports that he has never smoked. He has never used smokeless tobacco.    Family History:  Family History   Problem Relation Age of Onset     Arthritis Sister         DEBRA     Hypothyroidism Sister         Hashimoto's     Hypothyroidism Sister         Hashimoto's     Asthma Mother      Heart Disease Mother         Caban- Parkinson White     Seasonal/Environmental Allergies Mother      Rheumatoid Arthritis Maternal Aunt      Multiple Sclerosis Maternal Aunt      Seasonal/Environmental Allergies Brother      Seasonal/Environmental Allergies Sister      Seasonal/Environmental Allergies Sister      Seasonal/Environmental Allergies Father      Asthma Brother      Eczema Brother      Skin Cancer No family hx of      Melanoma No family hx of        Medications:  Current Outpatient Medications   Medication Sig Dispense Refill     albuterol (PROAIR HFA, PROVENTIL HFA, VENTOLIN HFA) 108 (90 BASE) MCG/ACT inhaler Inhale 2 puffs into the lungs every 6 hours       Dexmethylphenidate HCl (FOCALIN PO) Take 5 mg by mouth daily        PARoxetine (PAXIL) 10 MG tablet Take 20 mg by mouth every morning       clobetasol (TEMOVATE) 0.05 % cream Apply topically daily Use on scalp daily 6 days a week (Patient not taking: Reported on 3/11/2019) 45 g 3     Dupilumab (DUPIXENT SC)        Dupilumab (DUPIXENT) 300 MG/2ML syringe Inject 2 mLs (300 mg) " Subcutaneous every 14 days (Patient not taking: Reported on 11/17/2020) 4 mL 1     ketoconazole (NIZORAL) 2 % shampoo Apply to scalp, lather, leave on for a couple of minutes, then rinse, do 2 times per week (Patient not taking: Reported on 11/17/2020) 120 mL 5     tacrolimus (PROTOPIC) 0.1 % ointment Apply topically 2 times daily (Patient not taking: Reported on 11/17/2020) 100 g 1     triamcinolone acetonide (KENALOG) 10 MG/ML injection See med note (Patient not taking: Reported on 3/11/2019) 5 mL 0     Allergies   Allergen Reactions     Cefzil [Cefprozil] Hives         Review of Systems:  -As per HPI  -Constitutional: Otherwise feeling well today, in usual state of health.  -Skin: As above in HPI. No additional skin concerns.    Physical exam:  Vitals: There were no vitals taken for this visit.  GEN: This is a well developed, well-nourished female in no acute distress, in a pleasant mood.    SKIN: Sun-exposed skin, which includes the head/face, neck, both arms, digits, and/or nails was examined.   -Nettles skin type: II  -Oval shaped areas of alopecia on the right postauricular scalp x 1, left vertex scalp x 1, occipital scalp x 3. Positive hair pull test on periphery of lesions. No significant scalp erythema or scale. No eyebrow or eyelash changes.   -No other lesions of concern on areas examined.     Labs:  None.     Impression/Plan:    1. Alopecia areata. Recent flare with patchy involvement on the vertex, right postauricular scalp, and occipital scalp.     - ILK as below  - Start allegra 180 mg PO qdaily as adjuvant to ILK  - Start clobex 0.05% shampoo once weekly to prevent new spots  - Follow-up in 6-8 weeks    PROCEDURE NOTE  - Kenalog intralesional injection procedure note: After verbal consent and discussion of risks including but not limited to atrophy, pain, and bruising, time out was performed, the patient underwent positioning and the area was prepped with isopropyl alcohol, 2.5 total cc of  Kenalog 5 mg/cc was injected into 25 site(s) on the scalp. The patient tolerated the procedure well and left the Dermatology clinic in good condition.     2. Seborrheic dermatitis. Refill of ketoconazole 2% shampoo three times per week.     Follow-up in 6-8 weeks, earlier for new or changing lesions.     Staff Involved:  Staff Only    Jose Manuel Friedman MD  Pronouns: he/him/his    Department of Dermatology  [facility]: Phone: [phone], Fax:[phone]  [facility]: Phone: [phone] Fax: [phone]

## 2020-11-17 NOTE — NURSING NOTE
Dermatology Rooming Note    Wayne Grigsby's goals for this visit include:   Chief Complaint   Patient presents with     Derm Problem     Wayne is here for a hairloss follow up, states it's better.        Sade Cruz, CARLAN

## 2020-11-17 NOTE — NURSING NOTE
Drug Administration Record    Prior to injection, verified patient identity using patient's name and date of birth.  Due to injection administration, patient instructed to remain in clinic for 15 minutes  afterwards, and to report any adverse reaction to me immediately.    Drug Name: triamcinolone acetonide(kenalog)  Dose: 2.5mL of triamcinolone 5mg/mL, 15mg dose  Route administered: ID  NDC #: Kenalog-10 (1918-4913-03)  Amount of waste(mL):2.5  Reason for waste: Multi dose vial    LOT #: [id number]  SITE: body  : [employer]  EXPIRATION DATE: March 2022

## 2020-11-17 NOTE — PATIENT INSTRUCTIONS
1. Start taking allegra 180 mg (allergy pill) once daily.   2. Start using ketoconazole 2% shampoo three times weekly in the shower.   3. Can use clobex shampoo once weekly to help prevent new spots. Apply 15 minutes prior to showering to dry scalp.

## 2020-11-17 NOTE — PROGRESS NOTES
"[facility] Dermatology Note      Dermatology Problem List:  1. Atopic dermatitis -Legs, popliteal fossa, antecubital fossa, buttocks, posterior thigh, groin are trouble spots  -Topical streroid creams/ointment currently using daily;currently using TMC 0.1% ointment  -Dupilimab effective but reports some  diarrhea, abdominal pain, and fatigue; encouraged to take every 2 weeks.  -Previously on Methotrexate and Cellcept last on 3/2014; used Elidil, Protopic in the past  2. 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     CC:   Chief Complaint   Patient presents with     Derm Problem     Wayne is here for a hairloss follow up, states it's better.      Encounter Date: Nov 17, 2020    History of Present Illness:  Mr. Wayne Grigsby is a 25 year old male who presents as a follow-up for alopecia areata. The patient was last seen 3/2019 when he had ILK injections and continued on clobex shampoo. Dupixent also continued for atopic dermatitis.     Today, he reports a few new alopecia spots on his occipital scalp, vertex scalp, and right postauricular area. No eyebrow, eyelash or body hair changes. He states he has had intermittent flares over the last few years, typically after receiving haircuts. He states his most recent flare was noted around the time of a recent haircut. He has had some regrowth in the spot on the vertex of the scalp, but no significant changes to the areas on the occipital scalp. He has not had ILK injections since last year. He also requests refill on his ketoconazole 2% shampoo.     He has also followed in the past for atopic dermatitis. He states he discontinued dupixent injections and has just been using topical emollients, including \"breast milk\" lotion from his financee. Health otherwise stable. No other skin concerns.     Past Medical History:   Patient Active Problem List   Diagnosis     Alopecia areata     Atopic dermatitis, unspecified type     Past Medical History: "   Diagnosis Date     ADHD (attention deficit hyperactivity disorder)      Eczema      Past Surgical History:   Procedure Laterality Date     MYRINGOPLASTY      2003     PE TUBES      x3     TONSILLECTOMY & ADENOIDECTOMY      1998, redo adenoidectomy       Social History:  Patient reports that he has never smoked. He has never used smokeless tobacco.    Family History:  Family History   Problem Relation Age of Onset     Arthritis Sister         DEBRA     Hypothyroidism Sister         Hashimoto's     Hypothyroidism Sister         Hashimoto's     Asthma Mother      Heart Disease Mother         Caban- Parkinson White     Seasonal/Environmental Allergies Mother      Rheumatoid Arthritis Maternal Aunt      Multiple Sclerosis Maternal Aunt      Seasonal/Environmental Allergies Brother      Seasonal/Environmental Allergies Sister      Seasonal/Environmental Allergies Sister      Seasonal/Environmental Allergies Father      Asthma Brother      Eczema Brother      Skin Cancer No family hx of      Melanoma No family hx of        Medications:  Current Outpatient Medications   Medication Sig Dispense Refill     albuterol (PROAIR HFA, PROVENTIL HFA, VENTOLIN HFA) 108 (90 BASE) MCG/ACT inhaler Inhale 2 puffs into the lungs every 6 hours       Dexmethylphenidate HCl (FOCALIN PO) Take 5 mg by mouth daily        PARoxetine (PAXIL) 10 MG tablet Take 20 mg by mouth every morning       clobetasol (TEMOVATE) 0.05 % cream Apply topically daily Use on scalp daily 6 days a week (Patient not taking: Reported on 3/11/2019) 45 g 3     Dupilumab (DUPIXENT SC)        Dupilumab (DUPIXENT) 300 MG/2ML syringe Inject 2 mLs (300 mg) Subcutaneous every 14 days (Patient not taking: Reported on 11/17/2020) 4 mL 1     ketoconazole (NIZORAL) 2 % shampoo Apply to scalp, lather, leave on for a couple of minutes, then rinse, do 2 times per week (Patient not taking: Reported on 11/17/2020) 120 mL 5     tacrolimus (PROTOPIC) 0.1 % ointment Apply topically 2 times  daily (Patient not taking: Reported on 11/17/2020) 100 g 1     triamcinolone acetonide (KENALOG) 10 MG/ML injection See med note (Patient not taking: Reported on 3/11/2019) 5 mL 0     Allergies   Allergen Reactions     Cefzil [Cefprozil] Hives         Review of Systems:  -As per HPI  -Constitutional: Otherwise feeling well today, in usual state of health.  -Skin: As above in HPI. No additional skin concerns.    Physical exam:  Vitals: There were no vitals taken for this visit.  GEN: This is a well developed, well-nourished female in no acute distress, in a pleasant mood.    SKIN: Sun-exposed skin, which includes the head/face, neck, both arms, digits, and/or nails was examined.   -Nettles skin type: II  -Oval shaped areas of alopecia on the right postauricular scalp x 1, left vertex scalp x 1, occipital scalp x 3. Positive hair pull test on periphery of lesions. No significant scalp erythema or scale. No eyebrow or eyelash changes.   -No other lesions of concern on areas examined.     Labs:  None.     Impression/Plan:    1. Alopecia areata. Recent flare with patchy involvement on the vertex, right postauricular scalp, and occipital scalp.     - ILK as below  - Start allegra 180 mg PO qdaily as adjuvant to ILK  - Start clobex 0.05% shampoo once weekly to prevent new spots  - Follow-up in 6-8 weeks    PROCEDURE NOTE  - Kenalog intralesional injection procedure note: After verbal consent and discussion of risks including but not limited to atrophy, pain, and bruising, time out was performed, the patient underwent positioning and the area was prepped with isopropyl alcohol, 2.5 total cc of Kenalog 5 mg/cc was injected into 25 site(s) on the scalp. The patient tolerated the procedure well and left the Dermatology clinic in good condition.     2. Seborrheic dermatitis. Refill of ketoconazole 2% shampoo three times per week.     Follow-up in 6-8 weeks, earlier for new or changing lesions.     Staff Involved:  Staff  Only    Jose Manuel Friedman MD  Pronouns: he/him/his    Department of Dermatology  [facility]: Phone: [phone], Fax:[phone]  [facility]: Phone: [phone] Fax: [phone]

## 2020-12-14 ENCOUNTER — HEALTH MAINTENANCE LETTER (OUTPATIENT)
Age: 25
End: 2020-12-14

## 2021-10-02 ENCOUNTER — HEALTH MAINTENANCE LETTER (OUTPATIENT)
Age: 26
End: 2021-10-02

## 2022-01-22 ENCOUNTER — HEALTH MAINTENANCE LETTER (OUTPATIENT)
Age: 27
End: 2022-01-22

## 2022-09-03 ENCOUNTER — HEALTH MAINTENANCE LETTER (OUTPATIENT)
Age: 27
End: 2022-09-03

## 2023-04-29 ENCOUNTER — HEALTH MAINTENANCE LETTER (OUTPATIENT)
Age: 28
End: 2023-04-29